# Patient Record
Sex: MALE | Race: WHITE | NOT HISPANIC OR LATINO | Employment: FULL TIME | URBAN - METROPOLITAN AREA
[De-identification: names, ages, dates, MRNs, and addresses within clinical notes are randomized per-mention and may not be internally consistent; named-entity substitution may affect disease eponyms.]

---

## 2017-05-04 ENCOUNTER — HOSPITAL ENCOUNTER (OUTPATIENT)
Dept: RADIOLOGY | Facility: HOSPITAL | Age: 67
Discharge: HOME | End: 2017-05-04
Attending: NEUROLOGICAL SURGERY
Payer: COMMERCIAL

## 2017-05-04 ENCOUNTER — OFFICE VISIT (OUTPATIENT)
Dept: NEUROSURGERY | Facility: CLINIC | Age: 67
End: 2017-05-04
Payer: COMMERCIAL

## 2017-05-04 VITALS
HEART RATE: 73 BPM | WEIGHT: 191.5 LBS | BODY MASS INDEX: 26.81 KG/M2 | HEIGHT: 71 IN | DIASTOLIC BLOOD PRESSURE: 75 MMHG | TEMPERATURE: 98 F | SYSTOLIC BLOOD PRESSURE: 113 MMHG

## 2017-05-04 DIAGNOSIS — Z98.1 HISTORY OF LUMBAR FUSION: ICD-10-CM

## 2017-05-04 DIAGNOSIS — M54.17 LUMBOSACRAL RADICULOPATHY: Primary | ICD-10-CM

## 2017-05-04 DIAGNOSIS — M54.17 LUMBOSACRAL RADICULOPATHY: ICD-10-CM

## 2017-05-04 PROCEDURE — 1125F AMNT PAIN NOTED PAIN PRSNT: CPT | Mod: S$GLB,,, | Performed by: NEUROLOGICAL SURGERY

## 2017-05-04 PROCEDURE — 72114 X-RAY EXAM L-S SPINE BENDING: CPT | Mod: 26,,, | Performed by: RADIOLOGY

## 2017-05-04 PROCEDURE — 72114 X-RAY EXAM L-S SPINE BENDING: CPT | Mod: TC

## 2017-05-04 PROCEDURE — 99204 OFFICE O/P NEW MOD 45 MIN: CPT | Mod: S$GLB,,, | Performed by: NEUROLOGICAL SURGERY

## 2017-05-04 PROCEDURE — 1159F MED LIST DOCD IN RCRD: CPT | Mod: S$GLB,,, | Performed by: NEUROLOGICAL SURGERY

## 2017-05-04 PROCEDURE — 1157F ADVNC CARE PLAN IN RCRD: CPT | Mod: 8P,S$GLB,, | Performed by: NEUROLOGICAL SURGERY

## 2017-05-04 PROCEDURE — 1160F RVW MEDS BY RX/DR IN RCRD: CPT | Mod: S$GLB,,, | Performed by: NEUROLOGICAL SURGERY

## 2017-05-04 PROCEDURE — 99999 PR PBB SHADOW E&M-NEW PATIENT-LVL III: CPT | Mod: 25,PBBFAC,, | Performed by: NEUROLOGICAL SURGERY

## 2017-05-04 RX ORDER — IBUPROFEN AND FAMOTIDINE 800; 26.6 MG/1; MG/1
1 TABLET, COATED ORAL 3 TIMES DAILY
Refills: 2 | COMMUNITY
Start: 2017-04-25 | End: 2017-09-27

## 2017-05-04 RX ORDER — SILODOSIN 4 MG/1
4 CAPSULE ORAL DAILY
COMMUNITY
End: 2023-09-14 | Stop reason: ALTCHOICE

## 2017-05-04 RX ORDER — OXYCODONE HYDROCHLORIDE 7.5 MG/1
TABLET ORAL
Refills: 0 | COMMUNITY
Start: 2017-03-27 | End: 2023-09-14 | Stop reason: SDUPTHER

## 2017-05-04 RX ORDER — DILTIAZEM HYDROCHLORIDE 180 MG/1
180 CAPSULE, EXTENDED RELEASE ORAL DAILY
COMMUNITY
End: 2023-09-14 | Stop reason: SDUPTHER

## 2017-05-04 RX ORDER — LISINOPRIL 20 MG/1
20 TABLET ORAL DAILY
COMMUNITY
End: 2023-09-14 | Stop reason: DRUGHIGH

## 2017-05-04 NOTE — PROGRESS NOTES
CHIEF COMPLAINT:  Second Opinion    By signing my name below, I, Deangelo Suazo, attest that this documentation has been prepared under the direction and in the presence of Vijay Munoz MD.    HPI:  Sundar Montanez is a 67 y.o.  male, who presents today for a second opinion. Pt's previous surgery was performed 12/2011 after injuring his back doing work around the house. Pt notes a year of relief following the surgery and then symptoms returned. Symptoms have continually worsened since then. Pt reports constant low back pain radiating to the posterior RLE. Walking, sitting, and standing exacerbate the pain. Pt has taken Ibuprofen 800 mg with some relief. Pt rates the pain as a 6/10. Pt denies pain in his LLE. Pt denies b/b dysfunction. Pt received a coronary stent last summer and is now on Aspirin and Plavix. Pt denies neck and arm pain. Pt also denies dropping objects. Pt notes imbalance secondary to RLE pain. Pt has received injections in the past.     Review of patient's allergies indicates:  No Known Allergies    No past medical history on file.  No past surgical history on file.  No family history on file.  Social History   Substance Use Topics    Smoking status: Former Smoker     Quit date: 5/4/1972    Smokeless tobacco: Never Used    Alcohol use No        Review of Systems   Constitutional: Negative.    HENT: Negative.    Eyes: Negative.    Respiratory: Negative.    Cardiovascular: Negative.    Gastrointestinal: Negative.    Endocrine: Negative.    Genitourinary: Negative.    Musculoskeletal: Positive for back pain (low back pain radiating to RLE) and myalgias (posterior RLE). Negative for gait problem and neck pain.   Skin: Negative.    Allergic/Immunologic: Negative.    Neurological: Negative for weakness, light-headedness, numbness and headaches.        Imbalance secondary to pain     Hematological: Negative.    Psychiatric/Behavioral: Negative.        OBJECTIVE:   Vital Signs:  Temp: 97.9 °F (36.6  °C) (05/04/17 0826)  Pulse: 73 (05/04/17 0826)  BP: 113/75 (05/04/17 0826)    Physical Exam:    Vital signs: All nursing notes and vital signs reviewed -- afebrile, vital signs stable.  Constitutional: Patient sitting comfortably in chair. Appears well developed and well nourished.  Skin: Exposed areas are intact without abnormal markings, rashes or other lesions. Four well healed incision in the lumbar region and left flank.   HEENT: Normocephalic. Normal conjunctivae.  Cardiovascular: Normal rate and regular rhythm.  Respiratory: Chest wall rises and falls symmetrically, without signs of respiratory distress.  Abdomen: Soft and non-tender.  Extremities: Warm and without edema. Calves supple, non-tender.  Psych/Behavior: Normal affect.    Neurological:    Mental status: Alert and oriented. Conversational and appropriate.       Cranial Nerves: Grossly intact.     Motor:    Upper:  Deltoids Triceps Biceps WE WF     R 5/5 5/5 5/5 5/5 5/5 5/5    L 5/5 5/5 5/5 5/5 5/5 5/5      Lower:  HF KE KF DF PF EHL    R 5/5 5/5 5/5 5/5 5/5 5/5    L 5/5 5/5 5/5 5/5 5/5 5/5     Sensory: Intact sensation to light touch in all extremities. Romberg negative.    Reflexes:          DTR: 2+ symmetrically throughout.     Flores's: Negative.     Babinski's: Negative.     Clonus: Negative.    Cerebellar: Finger-to-nose and rapid alternating movements normal. Gait stable, fluid.    Spine:    Posture: Head well aligned over pelvis in front and side views.  No focal or global spinal deformity visible on inspection. Shoulders and hips even. No obvious leg length discrepancy. No scapula winging.    Bending: Full ROM with forward, back and lateral bending. No rib prominence with forward bend.    Cervical:      ROM: Full with flexion, extension, lateral rotation and ear-to-shoulder bend.      Midline TTP: Negative.     Spurling's test: Negative.     Lhermitte's: Negative.    Thoracic:     Midline TTP: Negative    Lumbar:     Midline TTP:  Negative     Straight Leg Test: Negative     Crossed Straight Leg Test: Negative     Sciatic notch tenderness: Negative.    Other:     SI joint TTP: Negative.     Greater trochanter TTP: Negative.     Tenderness with external/internal hip rotation: Negative.    Diagnostic Results:  All imaging was independently reviewed by me.    MRI L-spine, dated 2/24/2017:  1. Pedicle screw fixation and XLIF L4/5  2. Moderate central and right neuroforaminal stenosis at L3/4   3. Mild to moderate right neuroforaminal stenosis at L5/S1  4. Diffuse lumbar spondylosis  5. Mild right coronal lumbar curve apex at L2      ASSESSMENT/PLAN:     Sundar Montanez has right lumbosacral radiculopathy in the setting of mild to moderate right L5/S1 neuroforaminal stenosis. I recommend a CT L-spine to assess for bony fusion at L4/5 and a Flex/Ex L-spine to rule out instability. I will reevaluate once complete. I anticipate starting with selective nerve block at right L5/S1.     The patient understands and agrees with the plan of care. All questions were answered.     1. CT L-spine   2. Flex/Ex L-spine  3. RTC pending #1-2    I, Dr. Vijay West personally performed the services described in this documentation. All medical record entries made by the scribe, Deangelo Suazo, were at my direction and in my presence.  I have reviewed the chart and agree that the record reflects my personal performance and is accurate and complete.      Vijay West M.D.  Department of Neurosurgery  [facility]      .

## 2017-05-04 NOTE — LETTER
May 4, 2017      Kenny Leavitt Sr., MD  [address]  [place] [address]           [facility] - Neurosurgery 7th Fl  [address]  [facility] [address]  Phone: [phone]          Patient: Sundar Montanez   MR Number: [id number]   YOB: 1950   Date of Visit: 5/4/2017       Dear Dr. Kenny Leavitt Sr.:    Thank you for referring Sundar Montanez to me for evaluation. Attached you will find relevant portions of my assessment and plan of care.    If you have questions, please do not hesitate to call me. I look forward to following Sundar Montanez along with you.    Sincerely,    Vijay West MD    Enclosure  CC:  No Recipients    If you would like to receive this communication electronically, please contact [email or web address] or [phone] to request more information on [employer] access.    For providers and/or their staff who would like to refer a patient to [facility], please contact us through our one-stop-shop provider referral line, [facility], at [phone].    If you feel you have received this communication in error or would no longer like to receive these types of communications, please e-mail [email or web address]

## 2017-06-06 ENCOUNTER — OFFICE VISIT (OUTPATIENT)
Dept: NEUROSURGERY | Facility: CLINIC | Age: 67
End: 2017-06-06
Payer: COMMERCIAL

## 2017-06-06 ENCOUNTER — HOSPITAL ENCOUNTER (OUTPATIENT)
Dept: RADIOLOGY | Facility: HOSPITAL | Age: 67
Discharge: HOME | End: 2017-06-06
Attending: NEUROLOGICAL SURGERY
Payer: COMMERCIAL

## 2017-06-06 VITALS
HEIGHT: 71 IN | HEART RATE: 67 BPM | BODY MASS INDEX: 27.77 KG/M2 | DIASTOLIC BLOOD PRESSURE: 89 MMHG | TEMPERATURE: 98 F | WEIGHT: 198.38 LBS | SYSTOLIC BLOOD PRESSURE: 148 MMHG

## 2017-06-06 DIAGNOSIS — M54.17 LUMBOSACRAL RADICULOPATHY: ICD-10-CM

## 2017-06-06 DIAGNOSIS — M48.061 SPINAL STENOSIS, LUMBAR: ICD-10-CM

## 2017-06-06 DIAGNOSIS — Z98.1 HISTORY OF LUMBAR FUSION: ICD-10-CM

## 2017-06-06 DIAGNOSIS — M51.36 DDD (DEGENERATIVE DISC DISEASE), LUMBAR: Primary | ICD-10-CM

## 2017-06-06 PROCEDURE — 99214 OFFICE O/P EST MOD 30 MIN: CPT | Mod: S$GLB,,, | Performed by: NEUROLOGICAL SURGERY

## 2017-06-06 PROCEDURE — 72131 CT LUMBAR SPINE W/O DYE: CPT | Mod: TC

## 2017-06-06 PROCEDURE — 1159F MED LIST DOCD IN RCRD: CPT | Mod: S$GLB,,, | Performed by: NEUROLOGICAL SURGERY

## 2017-06-06 PROCEDURE — 72131 CT LUMBAR SPINE W/O DYE: CPT | Mod: 26,,, | Performed by: RADIOLOGY

## 2017-06-06 PROCEDURE — 99999 PR PBB SHADOW E&M-EST. PATIENT-LVL III: CPT | Mod: PBBFAC,,, | Performed by: NEUROLOGICAL SURGERY

## 2017-06-06 PROCEDURE — 1125F AMNT PAIN NOTED PAIN PRSNT: CPT | Mod: S$GLB,,, | Performed by: NEUROLOGICAL SURGERY

## 2017-06-06 RX ORDER — SILODOSIN 8 MG/1
CAPSULE ORAL
COMMUNITY
Start: 2017-05-18 | End: 2023-09-14 | Stop reason: ALTCHOICE

## 2017-06-06 RX ORDER — HYDROCODONE BITARTRATE AND ACETAMINOPHEN 10; 325 MG/1; MG/1
TABLET ORAL
COMMUNITY
Start: 2017-04-19 | End: 2023-09-14 | Stop reason: ALTCHOICE

## 2017-06-06 RX ORDER — CLOPIDOGREL BISULFATE 75 MG/1
TABLET ORAL
COMMUNITY
Start: 2017-05-18 | End: 2023-09-14

## 2017-06-06 RX ORDER — LISINOPRIL 40 MG/1
TABLET ORAL
COMMUNITY
Start: 2017-05-08 | End: 2023-09-14 | Stop reason: DRUGHIGH

## 2017-06-06 RX ORDER — ESZOPICLONE 3 MG/1
TABLET, FILM COATED ORAL
COMMUNITY
Start: 2017-05-08 | End: 2023-06-12 | Stop reason: SDUPTHER

## 2017-06-06 RX ORDER — DILTIAZEM HYDROCHLORIDE 360 MG/1
CAPSULE, EXTENDED RELEASE ORAL
COMMUNITY
Start: 2017-05-08

## 2017-06-06 RX ORDER — ATORVASTATIN CALCIUM 20 MG/1
TABLET, FILM COATED ORAL
COMMUNITY
Start: 2017-05-18 | End: 2023-09-14 | Stop reason: SDUPTHER

## 2017-06-06 NOTE — PROGRESS NOTES
CHIEF COMPLAINT:  Follow Up     I, Deangelo Suazo, attest that this documentation has been prepared under the direction and in the presence of Vijay Munoz MD.    HPI:  Sundar Montanez is a 67 y.o.  male, who presents today for follow up evaluation of low back pain. Pt reports his condition is stable since his last visit. Pt has previously received injections in his low back. He has also participated in physical therapy; however, he believes it did not help and made his pain worse.     Review of patient's allergies indicates:  No Known Allergies    Past Medical History:   Diagnosis Date    Hypertension      Past Surgical History:   Procedure Laterality Date    SPINAL FUSION       Family History   Problem Relation Age of Onset    Heart attack Mother     Alcohol abuse Father     Breast cancer Sister     Breast cancer Sister      Social History   Substance Use Topics    Smoking status: Former Smoker     Quit date: 5/4/1972    Smokeless tobacco: Never Used    Alcohol use No        Review of Systems   Constitutional: Negative.    HENT: Negative.    Eyes: Negative.    Respiratory: Negative.    Cardiovascular: Negative.    Gastrointestinal: Negative.    Endocrine: Negative.    Genitourinary: Negative.    Musculoskeletal: Positive for back pain (Low back pain raditating to RLE). Negative for gait problem and neck pain.   Skin: Negative.    Allergic/Immunologic: Negative.    Neurological: Negative for weakness, light-headedness, numbness and headaches.   Hematological: Negative.    Psychiatric/Behavioral: Negative.        OBJECTIVE:   Vital Signs:  Temp: 97.8 °F (36.6 °C) (06/06/17 0920)  Pulse: 67 (06/06/17 0920)  BP: (!) 148/89 (06/06/17 0920)    Physical Exam:    Vital signs: All nursing notes and vital signs reviewed -- afebrile, vital signs stable.  Constitutional: Patient sitting comfortably in chair. Appears well developed and well nourished.  Skin: Exposed areas are intact without abnormal markings,  rashes or other lesions. Four well healed incision in the lumbar region and left flank.   HEENT: Normocephalic. Normal conjunctivae.  Cardiovascular: Normal rate and regular rhythm.  Respiratory: Chest wall rises and falls symmetrically, without signs of respiratory distress.  Abdomen: Soft and non-tender.  Extremities: Warm and without edema. Calves supple, non-tender.  Psych/Behavior: Normal affect.    Neurological:    Mental status: Alert and oriented. Conversational and appropriate.       Cranial Nerves: Grossly intact.     Motor:    Upper:  Deltoids Triceps Biceps WE WF     R 5/5 5/5 5/5 5/5 5/5 5/5    L 5/5 5/5 5/5 5/5 5/5 5/5      Lower:  HF KE KF DF PF EHL    R 5/5 5/5 5/5 5/5 5/5 5/5    L 5/5 5/5 5/5 5/5 5/5 5/5     Sensory: Intact sensation to light touch in all extremities. Romberg negative.    Reflexes:          DTR: 2+ symmetrically throughout.     Flores's: Negative.     Babinski's: Negative.     Clonus: Negative.    Cerebellar: Finger-to-nose and rapid alternating movements normal. Gait stable, fluid.    Spine:    Posture: Head well aligned over pelvis in front and side views.  No focal or global spinal deformity visible on inspection. Shoulders and hips even. No obvious leg length discrepancy. No scapula winging.    Bending: Full ROM with forward, back and lateral bending. No rib prominence with forward bend.    Cervical:      ROM: Full with flexion, extension, lateral rotation and ear-to-shoulder bend.      Midline TTP: Negative.     Spurling's test: Negative.     Lhermitte's: Negative.    Thoracic:     Midline TTP: Negative    Lumbar:     Midline TTP: Negative     Straight Leg Test: Negative     Crossed Straight Leg Test: Negative     Sciatic notch tenderness: Negative.    Other:     SI joint TTP: Negative.     Greater trochanter TTP: Negative.     Tenderness with external/internal hip rotation: Negative.    Diagnostic Results:  All imaging was independently reviewed by me.    Flex/Ex X-ray  L-spine, dated 5/4/2017:  1. No instability    CT L-spine, dated 6/6/2017:  1. Bony fusion at L4/5  2. Vacuum disc at L3/4 and L5/S1  3. 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 I will refer him to pain management for a selective nerve root block at right L5-S1 and facet joint injections at L3-4 and L5/S1. He will follow up with me in 3 months.     The patient understands and agrees with the plan of care. All questions were answered.     1. Referral to pain management  2. RTC 3 months    I, Dr. Vijay West personally performed the services described in this documentation. All medical record entries made by the scribe, Deangelo Suazo, were at my direction and in my presence.  I have reviewed the chart and agree that the record reflects my personal performance and is accurate and complete.      Vijay West M.D.  Department of Neurosurgery  [facility]

## 2017-07-18 ENCOUNTER — OFFICE VISIT (OUTPATIENT)
Dept: PAIN MEDICINE | Facility: CLINIC | Age: 67
End: 2017-07-18
Attending: ANESTHESIOLOGY
Payer: COMMERCIAL

## 2017-07-18 VITALS
TEMPERATURE: 98 F | BODY MASS INDEX: 27.72 KG/M2 | HEART RATE: 64 BPM | DIASTOLIC BLOOD PRESSURE: 78 MMHG | WEIGHT: 198 LBS | SYSTOLIC BLOOD PRESSURE: 118 MMHG | HEIGHT: 71 IN

## 2017-07-18 DIAGNOSIS — M47.816 LUMBAR SPONDYLOSIS: Primary | ICD-10-CM

## 2017-07-18 DIAGNOSIS — M51.36 DDD (DEGENERATIVE DISC DISEASE), LUMBAR: ICD-10-CM

## 2017-07-18 DIAGNOSIS — M47.816 FACET ARTHRITIS OF LUMBAR REGION: ICD-10-CM

## 2017-07-18 PROBLEM — M51.369 DDD (DEGENERATIVE DISC DISEASE), LUMBAR: Status: ACTIVE | Noted: 2017-07-18

## 2017-07-18 PROCEDURE — 99999 PR PBB SHADOW E&M-EST. PATIENT-LVL III: CPT | Mod: PBBFAC,,, | Performed by: ANESTHESIOLOGY

## 2017-07-18 PROCEDURE — 99245 OFF/OP CONSLTJ NEW/EST HI 55: CPT | Mod: S$GLB,,, | Performed by: ANESTHESIOLOGY

## 2017-07-18 NOTE — PROGRESS NOTES
Subjective:      Patient ID: Sundar Montanez is a 67 y.o. male.    Chief Complaint: Low-back Pain    Referred by: Vijay West MD     HPI: Pain has been going on for 5 years after his lumbar fusion. He states the fusion with pedicle screws helped for 1 year. Now has pain in his R leg and lower back- worse in lower back. Pain is sharp and throbbing and tingling. Worsened by sitting and standing and bending, walking and lifting and sitting up from sitting position. He saw Dr. West who referred him to us for focus on lumbar spondylosis and possible L5-S1 radiculopathy. He has tried PT, NSAIDs, nerve pain meds which all do not help. Opiates do help.     Past Medical History:   Diagnosis Date    Hypertension        Past Surgical History:   Procedure Laterality Date    SPINAL FUSION         Review of patient's allergies indicates:  No Known Allergies    Current Outpatient Prescriptions   Medication Sig Dispense Refill    atorvastatin (LIPITOR) 20 MG tablet       clopidogrel (PLAVIX) 75 mg tablet       diltiaZEM (TIAZAC) 360 MG CpSR       DUEXIS 800-26.6 mg Tab Take 1 tablet by mouth 3 (three) times daily.  2    eszopiclone 3 mg Tab       lisinopril (PRINIVIL,ZESTRIL) 40 MG tablet       silodosin (RAPAFLO) 4 mg Cap capsule Take 4 mg by mouth once daily.      diltiaZEM (TIAZAC) 180 MG CpSR Take 180 mg by mouth once daily.      hydrocodone-acetaminophen 10-325mg (NORCO)  mg Tab       lisinopril (PRINIVIL,ZESTRIL) 20 MG tablet Take 20 mg by mouth once daily.      OXAYDO 7.5 mg TbOr TK 1 T PO  QID PRN P  0    RAPAFLO 8 mg Cap capsule        No current facility-administered medications for this visit.        Family History   Problem Relation Age of Onset    Heart attack Mother     Alcohol abuse Father     Breast cancer Sister     Breast cancer Sister        Social History     Social History    Marital status:      Spouse name: N/A    Number of children: N/A    Years of education: N/A  "    Occupational History    Not on file.     Social History Main Topics    Smoking status: Former Smoker     Quit date: 5/4/1972    Smokeless tobacco: Never Used    Alcohol use No    Drug use: No    Sexual activity: Not on file     Other Topics Concern    Not on file     Social History Narrative    No narrative on file       Pain Scales  Best: 3/10  Worst: 9/10  Usually: 4/10  Today: 5/10    Low-back Pain   Pertinent negatives include no chest pain, fever, headaches or weight loss.       Interventional Pain History  3x injection in [place] ([facility])- unsure of type- did not help    Review of Systems   Constitution: Negative for chills, fever, malaise/fatigue, weight gain and weight loss.   HENT: Negative for ear pain, headaches and hoarse voice.    Eyes: Negative for blurred vision, pain and visual disturbance.   Cardiovascular: Negative for chest pain, dyspnea on exertion and irregular heartbeat.        Hypertension   Respiratory: Negative for cough, shortness of breath and wheezing.    Endocrine: Negative for cold intolerance and heat intolerance.   Hematologic/Lymphatic: Negative for adenopathy and bleeding problem. Does not bruise/bleed easily.   Skin: Negative for color change, itching and rash.   Musculoskeletal: Positive for back pain. Negative for neck pain.   Gastrointestinal: Negative for change in bowel habit, diarrhea, hematemesis, hematochezia, melena and vomiting.   Genitourinary: Negative for flank pain, frequency, hematuria and urgency.   Neurological: Negative for difficulty with concentration, dizziness, loss of balance and seizures.   Psychiatric/Behavioral: Negative for altered mental status, depression and suicidal ideas. The patient is not nervous/anxious.    Allergic/Immunologic: Negative for HIV exposure.   All other systems reviewed and are negative.          Objective:      /78   Pulse 64   Temp 98 °F (36.7 °C) (Oral)   Ht 5' 11" (1.803 m)   Wt 89.8 kg (198 lb)   " BMI 27.62 kg/m²   PHYSICAL EXAMINATION:  GEN:  Well developed, well nourished.  No acute distress. Normal pain behavior.  HEENT:  No trauma.  Mucous membranes moist.  Nares patent bilaterally.  PSYCH: Normal affect. Thought content appropriate.  CHEST:  Breathing symmetric.  No audible wheezing.  ABD: Soft, non-tender, non-distended.  SKIN:  Warm, pink, dry.  No rash on exposed areas.    EXT:  No cyanosis, clubbing, or edema.  No color change or changes in nail or hair growth.  NEURO/MUSCULOSKELETAL:  Fully alert, oriented, and appropriate. Speech normal rachel. No cranial nerve deficits.   Gait: Normal.  No trendelenburg sign bilaterally.   Strength: 5/5 motor strength throughout bilateral upper and lower extremities myotomes.   Sensory: No sensory deficit in the lower extremities dermatomes.   Reflexes:  2+ and symmetric throughout.  Downgoing Babinski's bilaterally, negative euceda's.  No clonus or spasticity.    L-Spine:  Normal ROM with pain on ext. +facet loading bilaterally.  - SLR bilaterally.  - femoral stretch bilaterally. - Milgram's test. - Well-SLR. - Slump test.  TTP over lumbar paraspinals.    SI Joint/Hip: +ANAMARIA bilaterally. +Gaenslen's bilaterally.  - FADIR bilaterally.  TTP over bilateral SI joints. No TTP over hips, piriformis muscles, or GTB.      Ortho/SPM Exam      Assessment:       Encounter Diagnoses   Name Primary?    Lumbar spondylosis Yes    Facet arthritis of lumbar region     DDD (degenerative disc disease), lumbar          Plan:       Sundar was seen today for low-back pain.    Diagnoses and all orders for this visit:    Lumbar spondylosis    Facet arthritis of lumbar region    DDD (degenerative disc disease), lumbar      -Release of record for previous interventional procedures with Dr Rob in [facility] L3/L4 and L5/S1 FJIs scheduled, consent signed after discussing risks, benefits and alternatives  -If only short term benefit from FJIs- will schedule RFA to corresponding  Medial branches and dorsal ramus  -Will consider epidural at right L5 and S1 at follow up  -Discussed surgery vs SCS as future means of tx if no benefit from interventional procedures  -Discussed guidelines for stopping Plavix before his procedure.        Mayank Henson D.O.  Resident    I have personally taken the history and examined this patient and agree with the resident's note as stated above.

## 2017-07-18 NOTE — LETTER
July 18, 2017      Vijay West MD  [address]  [facility] [address]           [facility]  [address]  [facility] [address]  Phone: [phone]  Fax: [phone]          Patient: Sundar Montanez   MR Number: [id number]   YOB: 1950   Date of Visit: 7/18/2017       Dear Dr. Vijay West:    Thank you for referring Sundar Montanez to me for evaluation. Attached you will find relevant portions of my assessment and plan of care.    If you have questions, please do not hesitate to call me. I look forward to following Sundar Montanez along with you.    Sincerely,    Jamshid Knight MD    Enclosure  CC:  No Recipients    If you would like to receive this communication electronically, please contact [email or web address] or [phone] to request more information on [employer] access.    For providers and/or their staff who would like to refer a patient to [facility], please contact us through our one-stop-shop provider referral line, [facility], at [phone].    If you feel you have received this communication in error or would no longer like to receive these types of communications, please e-mail [email or web address]

## 2017-08-01 ENCOUNTER — PATIENT MESSAGE (OUTPATIENT)
Dept: PAIN MEDICINE | Facility: CLINIC | Age: 67
End: 2017-08-01

## 2017-08-01 ENCOUNTER — TELEPHONE (OUTPATIENT)
Dept: PAIN MEDICINE | Facility: CLINIC | Age: 67
End: 2017-08-01

## 2017-08-01 NOTE — TELEPHONE ENCOUNTER
Contacted and spoke to patient, informed him the office has received his clearance and the schedulers will be contacting him.

## 2017-08-01 NOTE — TELEPHONE ENCOUNTER
----- Message from Griselda Kim sent at 8/1/2017  1:37 PM CDT -----  _  1st Request  _  2nd Request  _  3rd Request      patient  Who:     Why:   Pt is calling to schedule his procedure, his wife emailed his release from his Cardio doctor Dr Jose Manuel Jasso. Please check to see if you received it.  Please call pt   What Number to Call Back:[phone]    When to Expect a call back: (With in 24 hours)

## 2017-09-11 ENCOUNTER — SURGERY (OUTPATIENT)
Age: 67
End: 2017-09-11

## 2017-09-11 ENCOUNTER — HOSPITAL ENCOUNTER (OUTPATIENT)
Facility: OTHER | Age: 67
Discharge: HOME | End: 2017-09-11
Attending: ANESTHESIOLOGY | Admitting: ANESTHESIOLOGY
Payer: COMMERCIAL

## 2017-09-11 VITALS
WEIGHT: 190 LBS | DIASTOLIC BLOOD PRESSURE: 71 MMHG | TEMPERATURE: 98 F | OXYGEN SATURATION: 97 % | HEIGHT: 71 IN | BODY MASS INDEX: 26.6 KG/M2 | RESPIRATION RATE: 18 BRPM | HEART RATE: 60 BPM | SYSTOLIC BLOOD PRESSURE: 117 MMHG

## 2017-09-11 DIAGNOSIS — M47.816 LUMBAR SPONDYLOSIS: Primary | ICD-10-CM

## 2017-09-11 DIAGNOSIS — M47.816 SPONDYLOSIS OF LUMBAR SPINE: ICD-10-CM

## 2017-09-11 PROCEDURE — 25500020 PHARM REV CODE 255: Performed by: ANESTHESIOLOGY

## 2017-09-11 PROCEDURE — 63600175 PHARM REV CODE 636 W HCPCS: Performed by: ANESTHESIOLOGY

## 2017-09-11 PROCEDURE — 25000003 PHARM REV CODE 250: Performed by: ANESTHESIOLOGY

## 2017-09-11 PROCEDURE — 64493 INJ PARAVERT F JNT L/S 1 LEV: CPT | Mod: 50,,, | Performed by: ANESTHESIOLOGY

## 2017-09-11 PROCEDURE — 64494 INJ PARAVERT F JNT L/S 2 LEV: CPT | Mod: 50,,, | Performed by: ANESTHESIOLOGY

## 2017-09-11 PROCEDURE — 64494 INJ PARAVERT F JNT L/S 2 LEV: CPT | Mod: 50 | Performed by: ANESTHESIOLOGY

## 2017-09-11 PROCEDURE — 64493 INJ PARAVERT F JNT L/S 1 LEV: CPT | Mod: 50 | Performed by: ANESTHESIOLOGY

## 2017-09-11 RX ORDER — LIDOCAINE HYDROCHLORIDE 10 MG/ML
INJECTION INFILTRATION; PERINEURAL
Status: DISCONTINUED | OUTPATIENT
Start: 2017-09-11 | End: 2017-09-11 | Stop reason: HOSPADM

## 2017-09-11 RX ORDER — TRIAMCINOLONE ACETONIDE 40 MG/ML
INJECTION, SUSPENSION INTRA-ARTICULAR; INTRAMUSCULAR
Status: DISCONTINUED | OUTPATIENT
Start: 2017-09-11 | End: 2017-09-11 | Stop reason: HOSPADM

## 2017-09-11 RX ORDER — BUPIVACAINE HYDROCHLORIDE 2.5 MG/ML
INJECTION, SOLUTION EPIDURAL; INFILTRATION; INTRACAUDAL
Status: DISCONTINUED | OUTPATIENT
Start: 2017-09-11 | End: 2017-09-11 | Stop reason: HOSPADM

## 2017-09-11 RX ORDER — ALPRAZOLAM 0.5 MG/1
0.5 TABLET, ORALLY DISINTEGRATING ORAL ONCE
Status: COMPLETED | OUTPATIENT
Start: 2017-09-11 | End: 2017-09-11

## 2017-09-11 RX ADMIN — LIDOCAINE HYDROCHLORIDE 10 ML: 10 INJECTION, SOLUTION INFILTRATION; PERINEURAL at 11:09

## 2017-09-11 RX ADMIN — IOHEXOL 3 ML: 300 INJECTION, SOLUTION INTRAVENOUS at 11:09

## 2017-09-11 RX ADMIN — TRIAMCINOLONE ACETONIDE 40 MG: 40 INJECTION, SUSPENSION INTRA-ARTICULAR; INTRAMUSCULAR at 11:09

## 2017-09-11 RX ADMIN — ALPRAZOLAM 0.5 MG: 0.5 TABLET, ORALLY DISINTEGRATING ORAL at 11:09

## 2017-09-11 RX ADMIN — BUPIVACAINE HYDROCHLORIDE 10 ML: 2.5 INJECTION, SOLUTION EPIDURAL; INFILTRATION; INTRACAUDAL; PERINEURAL at 11:09

## 2017-09-11 NOTE — DISCHARGE INSTRUCTIONS

## 2017-09-11 NOTE — OP NOTE
Thoracic/Lumbar Facet Joint Injection Under Fluoroscopy  Time-out taken to identify patient and procedure side prior to starting the procedure.            I attest that I have reviewed the patient's home medications prior to the procedure and no contraindication have been identified. I  re-evaluated the patient after the patient was positioned for the procedure in the procedure room immediately before the procedural time-out. The vital signs are current and represent the current state of the patient which has not significantly changed since the preprocedure assessment.   Date of Service: 09/11/2017    PCP: Kenny Leavitt Sr, MD    Referring Physician:                                                        PROCEDURE:  bilateral facet joint injection at L3-4 and L5-S1 under fluoroscopy.    REASON FOR PROCEDURE: Facet arthropathy, lumbar [M12.88]    PHYSICIAN: Jamshid Knight MD  ASSISTANTS: Dayo Johnson MD    MEDICATIONS INJECTED:  0.5ml Kenalog 40mg and Bupivacaine 0.25% 10ml. Injected 1.5ml  per level.  LOCAL ANESTHETIC USED:   Xylocaine 1% 9ml with Sodium Bicarbonate 1ml. 3ml per site.  SEDATION MEDICATIONS: None    ESTIMATED BLOOD LOSS:  None.    COMPLICATIONS:  None.    TECHNIQUE:   Lying in a prone position, the patient was prepped and draped in the usual sterile fashion using ChloraPrep and fenestrated drape.  The level was determined under fluoroscopic guidance.  Local anesthetic was given by going down to the hub of the 27-gauge 1.25in needle and raising a wheel.  The 3.5in 22-gauge needle was introduced into the >>facet joint.  Negative pressure applied to make sure that there was no intravascular placement.  Omnipaque was injected to confirm placement.  It was also done to confirm that there was no vascular runoff.  Medication was then injected slowly.  The patient tolerated the procedure well.     PAIN BEFORE THE PROCEDURE:  7/10.    PAIN AFTER THE PROCEDURE: 1/10.    The patient was monitored after  the procedure.  Patient was given post procedure and discharge instructions to follow at home.  We will see the patient back in two weeks or the patient may call to inform of status. The patient was discharged in a stable condition

## 2017-09-18 ENCOUNTER — TELEPHONE (OUTPATIENT)
Dept: PAIN MEDICINE | Facility: CLINIC | Age: 67
End: 2017-09-18

## 2017-09-18 NOTE — TELEPHONE ENCOUNTER
----- Message from Ector Jasso sent at 9/18/2017  3:25 PM CDT -----  Contact: Pt  X_ 1st Request  _ 2nd Request  _ 3rd Request    Who:NAOMIE LINN [[id number]]    Why: Patient would like to speak with staff in regards to feeling worse since his injections. Please advise    What Number to Call Back: [phone]    When to Expect a call back: (Before the end of the day)  -- if call after 3:00 call back will be tomorrow.

## 2017-09-20 ENCOUNTER — TELEPHONE (OUTPATIENT)
Dept: PAIN MEDICINE | Facility: CLINIC | Age: 67
End: 2017-09-20

## 2017-09-20 NOTE — TELEPHONE ENCOUNTER
Contacted and spoke to patient regarding his message, staff apologized for the delayed call.     Patient reports he had a procedure B/L Facet Injections @ L5/S1 and L3/L4 on 09/11/2017 and he is experiencing increase pain at this time.     He was informed by the staff that, it has been approximately 8 days and some pain, even increase pain is normal. Up to 14 days after the procedure. The physicians normally recommends patients use ice on the injection site in 20  Minute increments and if he has no contraindication he can take otc medication to help with some of the pain.     He does have a follow up to come in 2 weeks time after procedure, but he was informed to contact us if for any reason he has additional concerns.     Patient verbalized understanding.

## 2017-09-20 NOTE — TELEPHONE ENCOUNTER
----- Message from Melissa Melchor MA sent at 9/18/2017  5:04 PM CDT -----  Kalee Breen Staff  Caller: Patient (Today,  4:35 PM)          _1st Request   X_  2nd Request   _  3rd Request     Who:NAOMIE LINN [[id number]]       Why:Patient states he was returning a call back from the staff     What Number to Call Back:[phone]     When to Expect a call back: (Before the end of the day)   -- if call after 3:00 call back will be tomorrow.

## 2017-09-27 ENCOUNTER — OFFICE VISIT (OUTPATIENT)
Dept: PAIN MEDICINE | Facility: CLINIC | Age: 67
End: 2017-09-27
Payer: COMMERCIAL

## 2017-09-27 VITALS
TEMPERATURE: 97 F | DIASTOLIC BLOOD PRESSURE: 75 MMHG | RESPIRATION RATE: 16 BRPM | HEIGHT: 71 IN | BODY MASS INDEX: 26.85 KG/M2 | SYSTOLIC BLOOD PRESSURE: 140 MMHG | HEART RATE: 61 BPM | WEIGHT: 191.81 LBS

## 2017-09-27 DIAGNOSIS — M54.17 LUMBOSACRAL RADICULOPATHY: Primary | ICD-10-CM

## 2017-09-27 DIAGNOSIS — M47.816 FACET ARTHRITIS OF LUMBAR REGION: ICD-10-CM

## 2017-09-27 DIAGNOSIS — M17.11 PRIMARY OSTEOARTHRITIS OF RIGHT KNEE: ICD-10-CM

## 2017-09-27 DIAGNOSIS — M47.816 LUMBAR SPONDYLOSIS: ICD-10-CM

## 2017-09-27 DIAGNOSIS — M51.36 DDD (DEGENERATIVE DISC DISEASE), LUMBAR: ICD-10-CM

## 2017-09-27 PROCEDURE — 3008F BODY MASS INDEX DOCD: CPT | Mod: S$GLB,,, | Performed by: NURSE PRACTITIONER

## 2017-09-27 PROCEDURE — 99214 OFFICE O/P EST MOD 30 MIN: CPT | Mod: S$GLB,,, | Performed by: NURSE PRACTITIONER

## 2017-09-27 PROCEDURE — 1125F AMNT PAIN NOTED PAIN PRSNT: CPT | Mod: S$GLB,,, | Performed by: NURSE PRACTITIONER

## 2017-09-27 PROCEDURE — 1159F MED LIST DOCD IN RCRD: CPT | Mod: S$GLB,,, | Performed by: NURSE PRACTITIONER

## 2017-09-27 PROCEDURE — 99999 PR PBB SHADOW E&M-EST. PATIENT-LVL III: CPT | Mod: PBBFAC,,, | Performed by: NURSE PRACTITIONER

## 2017-09-27 RX ORDER — TIZANIDINE 4 MG/1
4 TABLET ORAL 2 TIMES DAILY PRN
Qty: 60 TABLET | Refills: 0 | Status: SHIPPED | OUTPATIENT
Start: 2017-09-27 | End: 2017-10-27

## 2017-09-27 RX ORDER — DICLOFENAC SODIUM 20 MG/G
2 SOLUTION TOPICAL 2 TIMES DAILY
Qty: 2 BOTTLE | Refills: 2 | Status: SHIPPED | OUTPATIENT
Start: 2017-09-27 | End: 2017-12-26

## 2017-09-27 NOTE — PROGRESS NOTES
Chronic patient Established Note (Follow up visit)      SUBJECTIVE:    Sundar Montanez presents to the clinic for a follow-up appointment for lower back and right lower extremity pain.  He is s/p bilateral L3-4 and L5-S1 facet joint injections on 9/11/17 with no pain relief.  He feels as though his pain is more severe.  He continues with pain that begins to the right lower back with radiation down the posterior aspect of the leg to the knee.  He feels as though his gait is affected.  He has a history of L4-5 fusion in 2011.  He was evaluated by Dr. West earlier this year and referred here.  Since the last visit, Sundar Montanez states the pain has been worsening. Current pain intensity is 7/10.    Pain Disability Index Review:  Last 3 PDI Scores 9/27/2017 7/18/2017   Pain Disability Index (PDI) 43 19       Pain Medications:  None    Opioid Contract: no     report:  Not applicable    Pain Procedures:   9/11/17 Bilateral L3-4 and L5-S1 facet joint injections- limited benefit    Physical Therapy/Home Exercise: yes    Imaging:     Lumbar CT 6/6/17    Narrative     Procedure Comment: 2.5-mm axial images of the lumbar spine were obtained without the administration of intravenous contrast material.  Coronal and sagittal reformatted images were reviewed.    Results:    There are postoperative changes of posterior spinal fusion with bi-pedicular screws and interbody disc spacer at the level of L4-L5. The hardware appears intact without evidence of abnormal surrounding lucency is or fractures. No evidence to suggest loosening. The lumbar vertebral bodies demonstrate adequate alignment. The vertebral body heights are maintained. There is mild narrowing of the disc spaces at multiple levels. Dextroscoliosis of the lumbar spine is noted. No evidence of acute fractures or dislocations.    T12-L1: No significant spinal canal stenosis or neuroforaminal narrowing.  L1-2: Mild disc bulge. No significant spinal canal  stenosis or neural foramina narrowing.  L2-3: Mild disc bulge. No significant spinal canal stenosis or neuroforaminal narrowing.  L3-4: Mild disc bulge. Resulting in mild spinal canal stenosis. No significant neuroforaminal narrowing.  L4-5: Mild disc bulge. No significant spinal canal stenosis or neural foramina narrowing.  L5-S1: No significant spinal canal stenosis or neural foramina narrowing.    Moderate atherosclerotic calcifications of the aorta and common iliacs are noted. The remaining visualized paravertebral structures demonstrate no pathology.   Impression          Stable postoperative changes of posterior spinal fusion with interbody disc spacer at the level of L4-L5. The hardware is intact without evidence of fractures or loosening.    Mild to moderate degenerative changes of the lumbar spine, as detailed above         Allergies: Review of patient's allergies indicates:  No Known Allergies    Current Medications:   Current Outpatient Prescriptions   Medication Sig Dispense Refill    atorvastatin (LIPITOR) 20 MG tablet       clopidogrel (PLAVIX) 75 mg tablet       diltiaZEM (TIAZAC) 180 MG CpSR Take 180 mg by mouth once daily.      diltiaZEM (TIAZAC) 360 MG CpSR       DUEXIS 800-26.6 mg Tab Take 1 tablet by mouth 3 (three) times daily.  2    eszopiclone 3 mg Tab       hydrocodone-acetaminophen 10-325mg (NORCO)  mg Tab       lisinopril (PRINIVIL,ZESTRIL) 20 MG tablet Take 20 mg by mouth once daily.      lisinopril (PRINIVIL,ZESTRIL) 40 MG tablet       OXAYDO 7.5 mg TbOr TK 1 T PO  QID PRN P  0    RAPAFLO 8 mg Cap capsule       silodosin (RAPAFLO) 4 mg Cap capsule Take 4 mg by mouth once daily.       No current facility-administered medications for this visit.        REVIEW OF SYSTEMS:    GENERAL:  No weight loss, malaise or fevers.  HEENT:  Negative for frequent or significant headaches.  NECK:  Negative for lumps, goiter, pain and significant neck swelling.  RESPIRATORY:  Negative for  "cough, wheezing or shortness of breath.  CARDIOVASCULAR:  Negative for chest pain, leg swelling or palpitations. Hypertension. Previous stent placement.    GI:  Negative for abdominal discomfort, blood in stools or black stools or change in bowel habits.  MUSCULOSKELETAL:  See HPI.  SKIN:  Negative for lesions, rash, and itching.  PSYCH:  Negative for sleep disturbance, mood disorder and recent psychosocial stressors.  HEMATOLOGY/LYMPHOLOGY:  Takes Plavix.  NEURO:   No history of headaches, syncope, paralysis, seizures or tremors.  All other reviewed and negative other than HPI.    Past Medical History:  Past Medical History:   Diagnosis Date    Hypertension        Past Surgical History:  Past Surgical History:   Procedure Laterality Date    SPINAL FUSION         Family History:  Family History   Problem Relation Age of Onset    Heart attack Mother     Alcohol abuse Father     Breast cancer Sister     Breast cancer Sister        Social History:  Social History     Social History    Marital status:      Spouse name: N/A    Number of children: N/A    Years of education: N/A     Social History Main Topics    Smoking status: Former Smoker     Quit date: 5/4/1972    Smokeless tobacco: Never Used    Alcohol use No    Drug use: No    Sexual activity: Not Asked     Other Topics Concern    None     Social History Narrative    None       OBJECTIVE:    BP (!) 140/75   Pulse 61   Temp 97 °F (36.1 °C) (Oral)   Resp 16   Ht 5' 11" (1.803 m)   Wt 87 kg (191 lb 12.8 oz)   BMI 26.75 kg/m²     PHYSICAL EXAMINATION:    General appearance: Well appearing, in no acute distress, alert and oriented x3.  Psych:  Mood and affect appropriate.  Skin: Skin color, texture, turgor normal, no rashes or lesions, in both upper and lower body.  Head/face:  Atraumatic, normocephalic. No palpable lymph nodes  Cor: RRR  Pulm: CTA  GI: Abdomen soft and non-tender.  Back: Straight leg raising in the sitting and supine " positions is negative to radicular pain. No pain to palpation over the spine or costovertebral angles.  Limited ROM with pain on flexion and extension.  Positive facet loading bilaterally.  Painful palpation to bilateral SI joints.  ANAMARIA is positive on the right.  Extremities: Peripheral joint ROM is full and pain free without obvious instability or laxity in all four extremities. No deformities, edema, or skin discoloration. Good capillary refill.  Musculoskeletal: 5/5 strength in right ankle with plantar and dorsiflexion. 5/5 strength in left ankle with plantar and dorsiflexion. 5/5 strength with right knee flexion and extension. 5/5 strength with left knee flexion and extension. 4/5 strength in right EHL, 4/5 strength in left EHL. No atrophy or tone abnormalities are noted.  Neuro: Bilateral upper and lower extremity coordination and muscle stretch reflexes are physiologic and symmetric.  Plantar response are downgoing.  No loss of sensation is noted.  Gait: Antalgic.    ASSESSMENT: 67 y.o. year old male with lower back and right leg pain, consistent with the following diagnoses:     1. Lumbosacral radiculopathy  diclofenac sodium (PENNSAID) 20 mg/gram /actuation(2 %) sopm    tizanidine (ZANAFLEX) 4 MG tablet   2. Lumbar spondylosis  diclofenac sodium (PENNSAID) 20 mg/gram /actuation(2 %) sopm    tizanidine (ZANAFLEX) 4 MG tablet   3. Facet arthritis of lumbar region  diclofenac sodium (PENNSAID) 20 mg/gram /actuation(2 %) sopm    tizanidine (ZANAFLEX) 4 MG tablet   4. DDD (degenerative disc disease), lumbar  diclofenac sodium (PENNSAID) 20 mg/gram /actuation(2 %) sopm    tizanidine (ZANAFLEX) 4 MG tablet   5. Primary osteoarthritis of right knee  diclofenac sodium (PENNSAID) 20 mg/gram /actuation(2 %) sopm    tizanidine (ZANAFLEX) 4 MG tablet         PLAN:     - Previous imaging was reviewed and discussed with the patient today.  Neurosurgery notes reviewed today.    - Will schedule for right L5 and S1 TF  MIGUELINA.  The procedure, risks, benefits and options were discussed with patient. There are no contraindications to the procedure. The patient expressed understanding and agreed to proceed.  Consent obtained today.    - We discussed lumbar SCS trial if limited benefit from above.  I provided him with a [facility] brochure.    - The patient will continue a home exercise routine to help with pain and strengthening.      - I discouraged the use of oral NSAIDs given use of Plavix and cardiac history.  He was recently prescribed Duexis and requested a refill from me.  Will start Pennsaid 2% to apply 2 pumps as needed BID for back and knee pain.  Will also start Zanaflex 4 mg BID PRN muscle pain.  He denies any history of liver disease.    - RTC 2 weeks after procedure.    - Counseled patient regarding the importance of constant sleeping habits and physical therapy.    - Dr. Knight was consulted on the patient and agrees with this plan.      The above plan and management options were discussed at length with patient. Patient is in agreement with the above and verbalized understanding.    Amarilis Barahona  09/27/2017

## 2017-11-02 ENCOUNTER — HOSPITAL ENCOUNTER (OUTPATIENT)
Facility: OTHER | Age: 67
Discharge: HOME | End: 2017-11-02
Attending: ANESTHESIOLOGY | Admitting: ANESTHESIOLOGY
Payer: COMMERCIAL

## 2017-11-02 VITALS
BODY MASS INDEX: 26.6 KG/M2 | HEART RATE: 68 BPM | SYSTOLIC BLOOD PRESSURE: 119 MMHG | OXYGEN SATURATION: 97 % | WEIGHT: 190 LBS | DIASTOLIC BLOOD PRESSURE: 73 MMHG | HEIGHT: 71 IN | TEMPERATURE: 98 F | RESPIRATION RATE: 18 BRPM

## 2017-11-02 DIAGNOSIS — M47.26 OSTEOARTHRITIS OF SPINE WITH RADICULOPATHY, LUMBAR REGION: Primary | ICD-10-CM

## 2017-11-02 DIAGNOSIS — G89.29 CHRONIC PAIN: ICD-10-CM

## 2017-11-02 PROCEDURE — 64484 NJX AA&/STRD TFRM EPI L/S EA: CPT | Mod: RT,,, | Performed by: ANESTHESIOLOGY

## 2017-11-02 PROCEDURE — 25000003 PHARM REV CODE 250: Performed by: ANESTHESIOLOGY

## 2017-11-02 PROCEDURE — 64483 NJX AA&/STRD TFRM EPI L/S 1: CPT | Mod: RT,,, | Performed by: ANESTHESIOLOGY

## 2017-11-02 PROCEDURE — 99152 MOD SED SAME PHYS/QHP 5/>YRS: CPT | Mod: ,,, | Performed by: ANESTHESIOLOGY

## 2017-11-02 PROCEDURE — 25000003 PHARM REV CODE 250: Performed by: PHYSICAL MEDICINE & REHABILITATION

## 2017-11-02 PROCEDURE — 64483 NJX AA&/STRD TFRM EPI L/S 1: CPT | Performed by: ANESTHESIOLOGY

## 2017-11-02 PROCEDURE — 64484 NJX AA&/STRD TFRM EPI L/S EA: CPT | Performed by: ANESTHESIOLOGY

## 2017-11-02 PROCEDURE — 63600175 PHARM REV CODE 636 W HCPCS: Performed by: ANESTHESIOLOGY

## 2017-11-02 PROCEDURE — 25500020 PHARM REV CODE 255: Performed by: ANESTHESIOLOGY

## 2017-11-02 RX ORDER — FENTANYL CITRATE 50 UG/ML
INJECTION, SOLUTION INTRAMUSCULAR; INTRAVENOUS
Status: DISCONTINUED | OUTPATIENT
Start: 2017-11-02 | End: 2017-11-02 | Stop reason: HOSPADM

## 2017-11-02 RX ORDER — LIDOCAINE HYDROCHLORIDE 10 MG/ML
INJECTION INFILTRATION; PERINEURAL
Status: DISCONTINUED | OUTPATIENT
Start: 2017-11-02 | End: 2017-11-02 | Stop reason: HOSPADM

## 2017-11-02 RX ORDER — LIDOCAINE HYDROCHLORIDE 10 MG/ML
INJECTION, SOLUTION EPIDURAL; INFILTRATION; INTRACAUDAL; PERINEURAL
Status: DISCONTINUED | OUTPATIENT
Start: 2017-11-02 | End: 2017-11-02 | Stop reason: HOSPADM

## 2017-11-02 RX ORDER — DEXAMETHASONE SODIUM PHOSPHATE 100 MG/10ML
INJECTION INTRAMUSCULAR; INTRAVENOUS
Status: DISCONTINUED | OUTPATIENT
Start: 2017-11-02 | End: 2017-11-02 | Stop reason: HOSPADM

## 2017-11-02 RX ORDER — MIDAZOLAM HYDROCHLORIDE 1 MG/ML
INJECTION INTRAMUSCULAR; INTRAVENOUS
Status: DISCONTINUED | OUTPATIENT
Start: 2017-11-02 | End: 2017-11-02 | Stop reason: HOSPADM

## 2017-11-02 RX ORDER — SODIUM CHLORIDE 9 MG/ML
500 INJECTION, SOLUTION INTRAVENOUS CONTINUOUS
Status: DISCONTINUED | OUTPATIENT
Start: 2017-11-02 | End: 2017-11-02 | Stop reason: HOSPADM

## 2017-11-02 RX ADMIN — SODIUM CHLORIDE 500 ML: 0.9 INJECTION, SOLUTION INTRAVENOUS at 11:11

## 2017-11-02 NOTE — DISCHARGE SUMMARY
Discharge Diagnosis:Lumbar radiculopathy [M54.16] DJD Lumbar spine  Condition on Discharge: Stable.  Diet on Discharge: Same as before.  Activity: as per instruction sheet.  Discharge to: Home with a responsible adult.  Follow up: as per Discharge instructions

## 2017-11-02 NOTE — DISCHARGE INSTRUCTIONS
Home Care Instructions Pain Management:    1. DIET:   You may resume your normal diet today.   2. BATHING:   You may shower with luke warm water.  3. DRESSING:   You may remove your bandage today.   4. ACTIVITY LEVEL:   You may resume your normal activities 24 hrs after your procedure.  5. MEDICATIONS:   You may resume your normal medications today.   6. SPECIAL INSTRUCTIONS:   No heat to the injection site for 24 hrs including, bath or shower, heating pad, moist heat, or hot tubs.    Use ice pack to injection site for any pain or discomfort.  Apply ice packs for 20 minute intervals as needed.   If you have received any sedatives by mouth today you may not drive for 12 hours.    If you have received any sedation through your IV, you may not drive for 24 hrs.     PLEASE CALL YOUR DOCTOR IF:  1. Redness or swelling around the injection site.  2. Fever of 101 degrees  3. Drainage (pus) from the injection site.  4. For any continuous bleeding (some dried blood over the incision is normal.)    FOR EMERGENCIES:   If any unusual problems or difficulties occur during clinic hours, call [phone] or [phone].   Procedural Sedation  Procedural sedation is medicine to ease discomfort, pain, and anxiety during a procedure. The medicine is often given through an intravenous (IV) line in your arm or hand. In some cases, the medicine may be taken by mouth or inhaled. While you are under sedation, you will likely be awake. But you may not remember anything afterward.  Why procedural sedation is used  Sedation is used for many types of procedures. The goal is to reduce pain, anxiety, and stressful memories of a procedure. It can also help your health care provider treat you. For example, having a broken bone fixed may be easier if you feel relaxed.  Procedural sedation is used only for short, basic procedures. It is not used for complex surgeries. Some procedures that use this type of sedation include:  · Dental surgery  · Breast  biopsy, to take a sample of breast tissue  · Endoscopy, to look at gastrointestinal problems  · Bronchoscopy, to check for lung problems  · Bone or joint realignment, to fix a broken bone or dislocated joint  · Minor foot or skin surgery  · Electrical cardioversion, to restore a normal heart rhythm  · Lumbar puncture, to assess neurological disease  Risks of procedural sedation  Procedural sedation has some risks and possible side effects, such as:  · Headache  · Nausea and vomiting  · Unpleasant memory of the procedure  · Lowered rate of breathing  · Changes in heart rate and blood pressure (rare)  · Inhalation of stomach contents into your lungs (rare)  Side effects will likely go away shortly after the procedure. Your health care team will watch your heart rate and breathing during your sedation. This is to help prevent problems.  Your own risks may vary based on your age and your overall health. They also depend on the type of sedation you are given. Talk with your health care provider about the risks that apply most to you.  Getting ready for procedural sedation  Talk with your health care provider how to get ready for your procedure. Tell him or her about all the medicines you take. This includes over-the-counter medicines such as ibuprofen. It also includes vitamins, herbs, and other supplements. You may need to stop taking some medicines before the procedure, such as blood thinners and aspirin. If you smoke, you may need to stop. Talk with your health care provider if you need help to stop smoking.  Tell your health care provider if you:  · Have had any problems in the past with sedation or anesthesia  · Have had any recent changes in your health, such as an infection or fever  · Are pregnant or think you may be pregnant  Also, make sure to:  · Ask a family member or friend to take you home after the procedure. You cannot drive on the day you receive sedation.  · Not eat or drink after midnight the night  before your procedure, if advised.  · Follow all other instructions from your health care provider.  During your procedural sedation  You may have your procedure in a hospital or a medical clinic. Sedation is done by a trained health care provider. In general, you can expect the following:  · You will be given medicine through an IV line in your arm or hand. Or you may receive a shot. The medicine may also be given by mouth. Or you may inhale it through a mask.  · If you receive medicine through an IV, you may feel the effects very quickly. You will start to feel relaxed and drowsy.  · During the procedure, your heart rate, breathing, and blood pressure will be closely watched. Your breathing and blood pressure may decrease a little. But you will likely not need help with your breathing. You may receive a little extra oxygen through a mask.  · You will probably be awake the entire time. If you do fall asleep, you should be easy to wake up, if needed. You should feel little or no pain.  · When your procedure is over, the sedative medicine will be stopped.  After your procedural sedation  You will begin to feel more awake and aware. But you will likely be drowsy for a while afterward. You will be closely watched as you become more alert. You may have a faint memory of the procedure. Or you may not remember it at all.  You should be able to return home within an hour or two after your procedure. Plan to have someone stay with you for a few hours. Side effects such as headache and nausea may go away quickly. Tell your health care provider if they continue.  Dont drive or make any important decisions for at least 24 hours. Be sure to follow all after-care instructions.      When to call your health care provider  Have someone call your health care provider right away if you have any of these:  · Drowsiness that gets worse  · Weakness or dizziness that gets worse  · Repeated vomiting  · You cant be awakened   Date Last  Reviewed: 2/6/2015  © 8772-6075 [employer], [employer]. [address] [place] [address], [place] [address]. All rights reserved. This information is not intended as a substitute for professional medical care. Always follow your healthcare professional's instructions.

## 2017-11-02 NOTE — H&P
Patient ID: Sundar Montanez is a 67 y.o. male.     Chief Complaint: Low-back Pain     Referred by: Vijay West MD      HPI: Patient continues with low back pain similar in character, distribution and intensity as before. He denies any bladder or bowel incontinence. See previous note for more details. He is here today for his scheduled RIGHT TFESI at L5 and S1.     Past Medical History:   Diagnosis Date    Hypertension        Past Surgical History:   Procedure Laterality Date    CORONARY ANGIOPLASTY WITH STENT PLACEMENT      SPINAL FUSION       Review of patient's allergies indicates:  No Known Allergies    Current Facility-Administered Medications   Medication Dose Route Frequency Provider Last Rate Last Dose    0.9%  NaCl infusion  500 mL Intravenous Continuous Leodan Fernandez, DO 25 mL/hr at 11/02/17 1120 500 mL at 11/02/17 1120       Family History   Problem Relation Age of Onset    Heart attack Mother     Alcohol abuse Father     Breast cancer Sister     Breast cancer Sister        Social History     Social History    Marital status:      Spouse name: N/A    Number of children: N/A    Years of education: N/A     Occupational History    Not on file.     Social History Main Topics    Smoking status: Former Smoker     Quit date: 5/4/1972    Smokeless tobacco: Never Used    Alcohol use No    Drug use: No    Sexual activity: Not on file     Other Topics Concern    Not on file     Social History Narrative    No narrative on file     Low-back Pain   Pertinent negatives include no chest pain, fever, headaches or weight loss.       Interventional Pain History  3x injection in [place] (Gil Rob)- unsure of type- did not help     Review of Systems   Constitution: Negative for chills, fever, malaise/fatigue, weight gain and weight loss.   HENT: Negative for ear pain, headaches and hoarse voice.    Eyes: Negative for blurred vision, pain and visual disturbance.   Cardiovascular: Negative  "for chest pain, dyspnea on exertion and irregular heartbeat.        Hypertension   Respiratory: Negative for cough, shortness of breath and wheezing.    Endocrine: Negative for cold intolerance and heat intolerance.   Hematologic/Lymphatic: Negative for adenopathy and bleeding problem. Does not bruise/bleed easily.   Skin: Negative for color change, itching and rash.   Musculoskeletal: Positive for back pain. Negative for neck pain.   Gastrointestinal: Negative for change in bowel habit, diarrhea, hematemesis, hematochezia, melena and vomiting.   Genitourinary: Negative for flank pain, frequency, hematuria and urgency.   Neurological: Negative for difficulty with concentration, dizziness, loss of balance and seizures.   Psychiatric/Behavioral: Negative for altered mental status, depression and suicidal ideas. The patient is not nervous/anxious.    Allergic/Immunologic: Negative for HIV exposure.   All other systems reviewed and are negative.            Objective:      /74 (BP Location: Right arm, Patient Position: Lying)   Pulse 62   Temp 97.7 °F (36.5 °C) (Oral)   Resp 18   Ht 5' 11" (1.803 m)   Wt 86.2 kg (190 lb)   SpO2 96%   BMI 26.50 kg/m²   PHYSICAL EXAMINATION:  GEN:  Well developed, well nourished.  No acute distress. Normal pain behavior.  HEENT:  No trauma.  Mucous membranes moist.  Nares patent bilaterally.  PSYCH: Normal affect. Thought content appropriate.  CHEST:  Breathing symmetric.  No audible wheezing.  ABD: Soft, non-tender, non-distended.  SKIN:  Warm, pink, dry.  No rash on exposed areas.    EXT:  No cyanosis, clubbing, or edema.  No color change or changes in nail or hair growth.  NEURO/MUSCULOSKELETAL:  Fully alert, oriented, and appropriate. Speech normal rachel. No cranial nerve deficits.   Gait: Normal.  No trendelenburg sign bilaterally.   Strength: 5/5 motor strength throughout bilateral upper and lower extremities myotomes.   Sensory: No sensory deficit in the lower " extremities dermatomes.   Reflexes:  2+ and symmetric throughout.  Downgoing Babinski's bilaterally, negative euceda's.  No clonus or spasticity.     L-Spine:  Normal ROM with pain on ext. +facet loading bilaterally.  - SLR bilaterally.  - femoral stretch bilaterally. - Milgram's test. - Well-SLR. - Slump test.  TTP over lumbar paraspinals.     SI Joint/Hip: +ANAMARIA bilaterally. +Gaenslen's bilaterally.  - FADIR bilaterally.  TTP over bilateral SI joints. No TTP over hips, piriformis muscles, or GTB.        Assessment:            Encounter Diagnoses   Name Primary?    Lumbar spondylosis Yes    Facet arthritis of lumbar region      DDD (degenerative disc disease), lumbar            Plan:       Sundar was seen today for low-back pain.      Lumbar spondylosis     Facet arthritis of lumbar region     DDD (degenerative disc disease), lumbar   We discussed with the patient the assessment and recommendations. The following is the plan we agreed on:  1. Proceed with right TFESI at L5 and S1.   2. F/u in two weeks or PRN.     Leodan Fernandez DO  Pain Medicine Fellow

## 2017-11-02 NOTE — OP NOTE
Date of Service: 11/02/2017    PCP: Kenny Leavitt Sr, MD    Referring Physician:    Time-out taken to identify patient and procedure side prior to starting the procedure.   I attest that I have reviewed the patient's home medications prior to the procedure and no contraindication have been identified. I  re-evaluated the patient after the patient was positioned for the procedure in the procedure room immediately before the procedural time-out. The vital signs are current and represent the current state of the patient which has not significantly changed since the preprocedure assessment.                                                           PROCEDURE: Right L5 and S1 transforaminal epidural steroid injection under fluoroscopy    REASON FOR PROCEDURE: Lumbar DJD with radiculopathy,  Lumbar radiculopathy [M54.16]    PHYSICIAN: Jamshid Knight MD  ASSISTANTS:DO Stanislav Shanks MD    MEDICATIONS INJECTED:  Preservative-free dexamethasone 10mg, Xylocaine 1% MPF 3-5ml. 3ml per level. Preservative free, sterile normal saline is used to get larger volume as needed.  LOCAL ANESTHETIC INJECTED:  Xylocaine 1% 9ml with Sodium Bicarbonate 1ml. 3ml per site.    SEDATION MEDICATIONS: Fentanyl 25 mg, Versed 2 mg  ESTIMATED BLOOD LOSS:  None.    COMPLICATIONS:  None.    TECHNIQUE:   Laying in a prone position, the patient was prepped and draped in the usual sterile fashion using ChloraPrep and fenestrated drape.  The area to be injected was determined under fluoroscopic guidance.  Local anesthetic was given by raising a wheel and going down to the hub of a 27-gauge 1.25 inch needle.  The 3.5inch 22-gauge spinal needle was introduced towards the transverse process of each above named nerve root level.  The needle was walked medially then hinged into the neural foramen.  Omnipaque was injected to confirm appropriate placement and that there was no vascular runoff.  The medication was then injected after applying  negative pressure. The patient tolerated the procedure well.    PAIN BEFORE THE PROCEDURE: 7/10.    PAIN AFTER THE PROCEDURE: 1/10.    The patient was monitored after the procedure.  Patient was given post procedure and discharge instructions to follow at home.  We will see the patient back in two weeks or the patient may call to inform of status. The patient was discharged in a stable condition.

## 2018-04-02 ENCOUNTER — TELEPHONE (OUTPATIENT)
Dept: SPINE | Facility: CLINIC | Age: 68
End: 2018-04-02

## 2018-04-02 NOTE — TELEPHONE ENCOUNTER
Patient notified of upcoming follow  Up appointment on 5/31 9:30 am with Dr. West     ----- Message from Ninfa Solomon sent at 4/2/2018 10:12 AM CDT -----  Contact: pt @ [phone]  Calling to schedule and appt with Dr. West. Please call.

## 2018-05-31 ENCOUNTER — HOSPITAL ENCOUNTER (OUTPATIENT)
Dept: RADIOLOGY | Facility: HOSPITAL | Age: 68
Discharge: HOME | End: 2018-05-31
Attending: STUDENT IN AN ORGANIZED HEALTH CARE EDUCATION/TRAINING PROGRAM
Payer: COMMERCIAL

## 2018-05-31 ENCOUNTER — OFFICE VISIT (OUTPATIENT)
Dept: NEUROSURGERY | Facility: CLINIC | Age: 68
End: 2018-05-31
Payer: COMMERCIAL

## 2018-05-31 VITALS
SYSTOLIC BLOOD PRESSURE: 92 MMHG | HEART RATE: 80 BPM | BODY MASS INDEX: 26.46 KG/M2 | TEMPERATURE: 98 F | HEIGHT: 71 IN | DIASTOLIC BLOOD PRESSURE: 59 MMHG | WEIGHT: 189 LBS

## 2018-05-31 DIAGNOSIS — M54.6 THORACIC SPINE PAIN: ICD-10-CM

## 2018-05-31 DIAGNOSIS — M54.6 THORACIC SPINE PAIN: Primary | ICD-10-CM

## 2018-05-31 DIAGNOSIS — G95.9 CERVICAL MYELOPATHY: ICD-10-CM

## 2018-05-31 PROCEDURE — 72128 CT CHEST SPINE W/O DYE: CPT | Mod: TC

## 2018-05-31 PROCEDURE — 99214 OFFICE O/P EST MOD 30 MIN: CPT | Mod: S$GLB,,, | Performed by: NEUROLOGICAL SURGERY

## 2018-05-31 PROCEDURE — 72128 CT CHEST SPINE W/O DYE: CPT | Mod: 26,,, | Performed by: RADIOLOGY

## 2018-05-31 PROCEDURE — 72082 X-RAY EXAM ENTIRE SPI 2/3 VW: CPT | Mod: 26,,, | Performed by: RADIOLOGY

## 2018-05-31 PROCEDURE — 99999 PR PBB SHADOW E&M-EST. PATIENT-LVL V: CPT | Mod: PBBFAC,,, | Performed by: NEUROLOGICAL SURGERY

## 2018-05-31 PROCEDURE — 72082 X-RAY EXAM ENTIRE SPI 2/3 VW: CPT | Mod: TC

## 2018-05-31 RX ORDER — ATORVASTATIN CALCIUM 40 MG/1
TABLET, FILM COATED ORAL
COMMUNITY
Start: 2018-05-08 | End: 2018-05-31 | Stop reason: DRUGHIGH

## 2018-05-31 RX ORDER — MELOXICAM 15 MG/1
TABLET ORAL
COMMUNITY
Start: 2018-05-17 | End: 2023-09-14 | Stop reason: ALTCHOICE

## 2018-05-31 RX ORDER — TAMSULOSIN HYDROCHLORIDE 0.4 MG/1
CAPSULE ORAL
COMMUNITY
Start: 2018-05-15 | End: 2023-09-14 | Stop reason: ALTCHOICE

## 2018-05-31 RX ORDER — SULFAMETHOXAZOLE AND TRIMETHOPRIM 800; 160 MG/1; MG/1
TABLET ORAL
COMMUNITY
Start: 2018-05-17 | End: 2023-09-14 | Stop reason: ALTCHOICE

## 2018-05-31 RX ORDER — HYDROCHLOROTHIAZIDE 25 MG/1
TABLET ORAL
COMMUNITY
Start: 2018-05-15 | End: 2023-09-14 | Stop reason: ALTCHOICE

## 2018-05-31 RX ORDER — TRAZODONE HYDROCHLORIDE 150 MG/1
TABLET ORAL
COMMUNITY
Start: 2018-04-03 | End: 2023-09-14 | Stop reason: ALTCHOICE

## 2018-05-31 RX ORDER — HYDROCODONE BITARTRATE AND ACETAMINOPHEN 7.5; 325 MG/1; MG/1
TABLET ORAL
COMMUNITY
Start: 2018-04-03 | End: 2023-09-14 | Stop reason: ALTCHOICE

## 2018-05-31 NOTE — PROGRESS NOTES
"CHIEF COMPLAINT:  Follow up after injections    I, Deangelo Suazo, attest that this documentation has been prepared under the direction and in the presence of Vijay West MD.    HPI:  Sundar Montanez is a 68 y.o.  male who presents today for follow up evaluation of low back pain after injections. Pt reports that he has experienced no relief following ESIs and facet joint inject injections with Dr. Knight. Pt reports back continued low back pain. He denies shooting leg pain but states that his back pain reaches his right buttock. He now also notes longstanding constant left-sided and midline mid back pain worsening 2-3 months ago, which is overshadowing his low back pain. He states that his low back pain is a 6-8/10 and his mid back pain is 10/10 in severityHe describes his pain as dull, aching pain. His mid back pain prevents him from taking deep breaths. Pt denies chest pain.  He also notes pain when he touches his left lateral ribcage at the same level as his back pain. Nothing alleviates his back pain. He was given Norco but discontinued due to upset stomach. He notes imbalance stating that he "walks like a drunk man". He denies any outright falls but often stumbles and catches himself. Pt denies hand clumsiness or numbness/tingling. He also denies b/b dysfunction but states that he takes Flomax and has been catheterized twice the in the past few years. He does feel like he has been urinating more frequently. Pt reports 30 lbs of weight loss in the past 2 months. He also notes constant fatigue. Pt has participated in physical therapy. Pt works on a [employer].       Review of patient's allergies indicates:  No Known Allergies    Past Medical History:   Diagnosis Date    Hypertension      Past Surgical History:   Procedure Laterality Date    CORONARY ANGIOPLASTY WITH STENT PLACEMENT      SPINAL FUSION       Family History   Problem Relation Age of Onset    Heart attack Mother     Alcohol abuse Father     " Breast cancer Sister     Breast cancer Sister      Social History   Substance Use Topics    Smoking status: Former Smoker     Quit date: 5/4/1972    Smokeless tobacco: Former User    Alcohol use No        Review of Systems   Constitutional: Positive for fatigue and unexpected weight change (30 lbs in past 2 months).   HENT: Negative.    Eyes: Negative.    Respiratory: Positive for shortness of breath (secondary to mid back pain).    Cardiovascular: Negative.    Gastrointestinal: Negative.    Endocrine: Negative.    Genitourinary: Negative.    Musculoskeletal: Positive for back pain (mid and low back pain) and myalgias (right buttock). Negative for gait problem and neck pain.        Lateral ribcage tenderness at level of mid back pain   Skin: Negative.    Allergic/Immunologic: Negative.    Neurological: Negative for weakness, light-headedness, numbness and headaches.        Imbalance   Hematological: Negative.    Psychiatric/Behavioral: Negative.        OBJECTIVE:   Vital Signs:  Temp: 97.8 °F (36.6 °C) (05/31/18 0848)  Pulse: 80 (05/31/18 0848)  BP: (!) 92/59 (05/31/18 0848)    Physical Exam:    Vital signs: All nursing notes and vital signs reviewed -- afebrile, vital signs stable.  Constitutional: Patient sitting comfortably in chair. Appears well developed and well nourished.  Skin: Exposed areas are intact without abnormal markings, rashes or other lesions.  Four well healed incisions in the lumbar region and left flank.   HEENT: Normocephalic. Normal conjunctivae.  Cardiovascular: Normal rate and regular rhythm.  Respiratory: Chest wall rises and falls symmetrically, without signs of respiratory distress.  Abdomen: Soft and non-tender.  Extremities: Warm and without edema. Calves supple, non-tender.  Psych/Behavior: Normal affect.    Neurological:    Mental status: Alert and oriented. Conversational and appropriate.       Cranial Nerves: Grossly intact.     Motor:    Upper:  Deltoids Triceps Biceps WE WF      R 5/5 5/5 5/5 5/5 5/5 5/5    L 5/5 5/5 5/5 5/5 5/5 5/5      Lower:  HF KE KF DF PF EHL    R 5/5 5/5 5/5 5/5 5/5 5/5    L 5/5 5/5 5/5 5/5 5/5 5/5     Sensory: Intact sensation to light touch in all extremities. Romberg negative.    Reflexes:          DTR: 2+ symmetrically throughout.     Flores's: Positive, right.      Babinski's: Negative.     Clonus: Negative.    Cerebellar: Finger-to-nose and rapid alternating movements normal. Gait stable, fluid.    Spine:    Posture: Slightly forward stooped posture. Shoulders and hips even. No obvious leg length discrepancy. No scapula winging. Rightward truncal shift.     Bending: Full ROM with forward, back and lateral bending. No rib prominence with forward bend.    Cervical:      ROM: Full with flexion, extension, lateral rotation and ear-to-shoulder bend.      Midline TTP: Negative.     Spurling's test: Negative.     Lhermitte's: Negative.    Thoracic:     Midline TTP: Negative     Left paraspinal TTP    Lumbar:     Midline TTP: Negative     Straight Leg Test: Negative     Crossed Straight Leg Test: Negative     Sciatic notch tenderness: Negative.    Other:     SI joint TTP: Negative.     Greater trochanter TTP: Negative.     Tenderness with external/internal hip rotation: Negative.    Diagnostic Results:  All imaging was independently reviewed by me.    CT T-spine, dated 05/31/2018:  1. Diffuse spondylosis   2. Otherwise unremarkable    Scoliosis X-ray, dated 05/31/2018:   1. Mild lumbar scoliosis   2. Acceptable global alignment  3. L4/5 posterior hardware  4. No obvious lytic lesions or fractures       ASSESSMENT/PLAN:     Sundar Montanez is s/p right L5/S1 transforaminal injection. He presents for follow up of low back pian. The injection did not work. In addition he has mid thoracic pain with left sided radiculopathy. Pt also noted 30 lb weight loss in 2 months and remote history of smoking. He has tenderness to palpation and percussion of the mid  thoracic spine as well as gait imbalance. At this time, I'm concerned for possible underlying malignancy versus myelopathy. Given the presence of thoracic pain as well as Flores's sign, we'll need an MRI T-spine. Given concerns for underlying malignancy we will obtain a CT Chest Abdomen Pelvis. He will follow up after all of this is complete.      The patient understands and agrees with the plan of care. All questions were answered.     1. MRI T-spine  2. CT Chest Abdomen Pelvis  3. RTC pending #1-2      I, Dr. Vijay West personally performed the services described in this documentation. All medical record entries made by the scribe, Deangelo Suazo, were at my direction and in my presence.  I have reviewed the chart and agree that the record reflects my personal performance and is accurate and complete.      Vijay West M.D.  Department of Neurosurgery  [facility]      .

## 2018-07-10 ENCOUNTER — PATIENT MESSAGE (OUTPATIENT)
Dept: NEUROSURGERY | Facility: CLINIC | Age: 68
End: 2018-07-10

## 2018-07-17 ENCOUNTER — TELEPHONE (OUTPATIENT)
Dept: NEUROSURGERY | Facility: CLINIC | Age: 68
End: 2018-07-17

## 2018-07-17 NOTE — TELEPHONE ENCOUNTER
DEBBIE pt to reschedule Dr. West appt to 7/30/18, scans rescheduled for same day. Pt verbalizes understanding.

## 2021-12-13 LAB — CRC RECOMMENDATION EXT: NORMAL

## 2023-01-05 LAB
CHOLESTEROL, TOTAL: 159 (ref 25–200)
HBA1C MFR BLD: 6 % (ref 4–6)
HDLC SERPL-MCNC: 44 MG/DL (ref 23–92)
LDLC SERPL CALC-MCNC: 85 MG/DL (ref 0–100)
TRIGL SERPL-MCNC: 151 MG/DL (ref 10–150)
VLDL CHOLESTEROL: 30.2 (ref 8–18)

## 2023-06-02 PROBLEM — N40.0 BPH (BENIGN PROSTATIC HYPERPLASIA): Chronic | Status: ACTIVE | Noted: 2023-06-02

## 2023-06-02 PROBLEM — E29.1 HYPOGONADISM IN MALE: Chronic | Status: ACTIVE | Noted: 2023-06-02

## 2023-06-02 PROBLEM — R91.1 LUNG NODULE: Chronic | Status: ACTIVE | Noted: 2023-06-02

## 2023-06-02 PROBLEM — I25.10 CAD S/P PERCUTANEOUS CORONARY ANGIOPLASTY: Chronic | Status: ACTIVE | Noted: 2023-06-02

## 2023-06-02 PROBLEM — F41.9 ANXIETY: Chronic | Status: ACTIVE | Noted: 2023-06-02

## 2023-06-02 PROBLEM — E03.9 HYPOTHYROID: Chronic | Status: ACTIVE | Noted: 2023-06-02

## 2023-06-02 PROBLEM — Z98.61 CAD S/P PERCUTANEOUS CORONARY ANGIOPLASTY: Chronic | Status: ACTIVE | Noted: 2023-06-02

## 2023-06-02 PROBLEM — R73.03 PREDIABETES: Chronic | Status: ACTIVE | Noted: 2023-06-02

## 2023-06-12 ENCOUNTER — CLINICAL SUPPORT (OUTPATIENT)
Dept: FAMILY MEDICINE | Facility: CLINIC | Age: 73
End: 2023-06-12
Payer: MEDICARE

## 2023-06-12 VITALS
HEART RATE: 80 BPM | SYSTOLIC BLOOD PRESSURE: 118 MMHG | WEIGHT: 189 LBS | TEMPERATURE: 97 F | DIASTOLIC BLOOD PRESSURE: 68 MMHG | BODY MASS INDEX: 26.36 KG/M2

## 2023-06-12 DIAGNOSIS — E29.1 HYPOGONADISM IN MALE: Primary | ICD-10-CM

## 2023-06-12 DIAGNOSIS — M51.36 DDD (DEGENERATIVE DISC DISEASE), LUMBAR: ICD-10-CM

## 2023-06-12 DIAGNOSIS — G47.00 INSOMNIA, UNSPECIFIED TYPE: Primary | ICD-10-CM

## 2023-06-12 DIAGNOSIS — G47.00 INSOMNIA, UNSPECIFIED TYPE: ICD-10-CM

## 2023-06-12 PROCEDURE — 96372 THER/PROPH/DIAG INJ SC/IM: CPT | Mod: S$GLB,,, | Performed by: FAMILY MEDICINE

## 2023-06-12 PROCEDURE — 96372 PR INJECTION,THERAP/PROPH/DIAG2ST, IM OR SUBCUT: ICD-10-PCS | Mod: S$GLB,,, | Performed by: FAMILY MEDICINE

## 2023-06-12 RX ORDER — CELECOXIB 100 MG/1
CAPSULE ORAL
COMMUNITY
End: 2023-06-12 | Stop reason: SDUPTHER

## 2023-06-12 RX ORDER — ESZOPICLONE 3 MG/1
3 TABLET, FILM COATED ORAL NIGHTLY
Qty: 30 TABLET | Refills: 2 | Status: SHIPPED | OUTPATIENT
Start: 2023-06-16 | End: 2023-09-14 | Stop reason: SDUPTHER

## 2023-06-12 RX ORDER — ESZOPICLONE 3 MG/1
3 TABLET, FILM COATED ORAL NIGHTLY
Qty: 30 TABLET | Refills: 2 | Status: CANCELLED | OUTPATIENT
Start: 2023-06-12

## 2023-06-12 RX ORDER — CELECOXIB 100 MG/1
CAPSULE ORAL
Qty: 90 CAPSULE | Refills: 0 | Status: SHIPPED | OUTPATIENT
Start: 2023-06-12 | End: 2023-08-31 | Stop reason: SDUPTHER

## 2023-06-12 RX ORDER — TESTOSTERONE CYPIONATE 200 MG/ML
200 INJECTION, SOLUTION INTRAMUSCULAR ONCE
Status: COMPLETED | OUTPATIENT
Start: 2023-06-12 | End: 2023-06-12

## 2023-06-12 RX ADMIN — TESTOSTERONE CYPIONATE 200 MG: 200 INJECTION, SOLUTION INTRAMUSCULAR at 11:06

## 2023-07-11 ENCOUNTER — TELEPHONE (OUTPATIENT)
Dept: FAMILY MEDICINE | Facility: CLINIC | Age: 73
End: 2023-07-11
Payer: MEDICARE

## 2023-07-11 PROBLEM — M54.40 LOW BACK PAIN WITH SCIATICA: Status: ACTIVE | Noted: 2023-07-11

## 2023-07-11 PROBLEM — G47.00 INSOMNIA: Status: ACTIVE | Noted: 2023-07-11

## 2023-07-12 ENCOUNTER — CLINICAL SUPPORT (OUTPATIENT)
Dept: FAMILY MEDICINE | Facility: CLINIC | Age: 73
End: 2023-07-12
Payer: MEDICARE

## 2023-07-12 VITALS
SYSTOLIC BLOOD PRESSURE: 136 MMHG | WEIGHT: 192.38 LBS | DIASTOLIC BLOOD PRESSURE: 72 MMHG | BODY MASS INDEX: 26.83 KG/M2 | HEART RATE: 86 BPM

## 2023-07-12 DIAGNOSIS — E78.2 MIXED HYPERLIPIDEMIA: Chronic | ICD-10-CM

## 2023-07-12 DIAGNOSIS — I10 PRIMARY HYPERTENSION: Primary | Chronic | ICD-10-CM

## 2023-07-12 DIAGNOSIS — G89.29 CHRONIC BILATERAL LOW BACK PAIN WITH BILATERAL SCIATICA: ICD-10-CM

## 2023-07-12 DIAGNOSIS — E29.1 HYPOGONADISM MALE: ICD-10-CM

## 2023-07-12 DIAGNOSIS — G47.00 INSOMNIA, UNSPECIFIED TYPE: ICD-10-CM

## 2023-07-12 DIAGNOSIS — M54.41 CHRONIC BILATERAL LOW BACK PAIN WITH BILATERAL SCIATICA: ICD-10-CM

## 2023-07-12 DIAGNOSIS — F41.9 ANXIETY: Chronic | ICD-10-CM

## 2023-07-12 DIAGNOSIS — M51.36 DDD (DEGENERATIVE DISC DISEASE), LUMBAR: ICD-10-CM

## 2023-07-12 DIAGNOSIS — M54.42 CHRONIC BILATERAL LOW BACK PAIN WITH BILATERAL SCIATICA: ICD-10-CM

## 2023-07-12 PROCEDURE — 96372 PR INJECTION,THERAP/PROPH/DIAG2ST, IM OR SUBCUT: ICD-10-PCS | Mod: ,,, | Performed by: FAMILY MEDICINE

## 2023-07-12 PROCEDURE — 96372 THER/PROPH/DIAG INJ SC/IM: CPT | Mod: ,,, | Performed by: FAMILY MEDICINE

## 2023-07-12 RX ORDER — TESTOSTERONE CYPIONATE 200 MG/ML
200 INJECTION, SOLUTION INTRAMUSCULAR
Status: COMPLETED | OUTPATIENT
Start: 2023-07-12 | End: 2023-07-12

## 2023-07-12 RX ADMIN — TESTOSTERONE CYPIONATE 200 MG: 200 INJECTION, SOLUTION INTRAMUSCULAR at 02:07

## 2023-07-12 NOTE — PROGRESS NOTES
Pt in office for nurse visit. Pt received Testosterone Injection in Left Upper Outer Glut. Pt tolerated well with no reaction.   Return in 28 days for repeat injection.  NDC :  52536-625-10  Patient Supplied

## 2023-07-18 ENCOUNTER — TELEPHONE (OUTPATIENT)
Dept: FAMILY MEDICINE | Facility: CLINIC | Age: 73
End: 2023-07-18

## 2023-07-18 DIAGNOSIS — G47.00 INSOMNIA, UNSPECIFIED TYPE: Primary | ICD-10-CM

## 2023-07-18 RX ORDER — HYDROXYZINE PAMOATE 25 MG/1
25 CAPSULE ORAL NIGHTLY PRN
Qty: 90 CAPSULE | Refills: 1 | Status: CANCELLED | OUTPATIENT
Start: 2023-07-18

## 2023-07-18 RX ORDER — HYDROXYZINE PAMOATE 25 MG/1
25 CAPSULE ORAL NIGHTLY PRN
COMMUNITY
End: 2024-02-19 | Stop reason: SDUPTHER

## 2023-07-18 NOTE — TELEPHONE ENCOUNTER
Last RX 12/27/2022  # 90  X 1   Last ov 02/2023  Next appt  (lab 07/18/2023    Results appt 07/24/2023

## 2023-07-29 ENCOUNTER — TELEPHONE (OUTPATIENT)
Dept: FAMILY MEDICINE | Facility: CLINIC | Age: 73
End: 2023-07-29
Payer: MEDICARE

## 2023-07-29 NOTE — TELEPHONE ENCOUNTER
----- Message from Reena Veras sent at 7/26/2023  3:57 PM CDT -----  Regarding: refill  Need refill for Hydroxyzine

## 2023-07-29 NOTE — TELEPHONE ENCOUNTER
Called [facility] mu[facility]ozef[facility] request for Hydroxyzine 25mg p.o. q hs #90 X1 refill per v/o order Dr. Leavitt

## 2023-08-01 DIAGNOSIS — E29.1 HYPOGONADISM IN MALE: Primary | ICD-10-CM

## 2023-08-01 RX ORDER — TESTOSTERONE CYPIONATE 200 MG/ML
200 INJECTION, SOLUTION INTRAMUSCULAR
Qty: 10 ML | Refills: 0 | Status: SHIPPED | OUTPATIENT
Start: 2023-08-01 | End: 2023-09-14

## 2023-08-01 RX ORDER — TESTOSTERONE CYPIONATE 200 MG/ML
200 INJECTION, SOLUTION INTRAMUSCULAR
COMMUNITY
End: 2023-08-01 | Stop reason: SDUPTHER

## 2023-08-01 NOTE — TELEPHONE ENCOUNTER
----- Message from Reena Veras sent at 7/31/2023  9:25 AM CDT -----  Regarding: refill  Needs testosterone refilled before next injection. 8/9/23  Isabella Navarrete

## 2023-08-09 ENCOUNTER — CLINICAL SUPPORT (OUTPATIENT)
Dept: FAMILY MEDICINE | Facility: CLINIC | Age: 73
End: 2023-08-09
Payer: MEDICARE

## 2023-08-09 VITALS
HEART RATE: 72 BPM | RESPIRATION RATE: 18 BRPM | SYSTOLIC BLOOD PRESSURE: 134 MMHG | BODY MASS INDEX: 26.78 KG/M2 | OXYGEN SATURATION: 96 % | DIASTOLIC BLOOD PRESSURE: 88 MMHG | TEMPERATURE: 97 F | WEIGHT: 192 LBS

## 2023-08-09 DIAGNOSIS — E29.1 HYPOGONADISM MALE: Primary | ICD-10-CM

## 2023-08-09 PROCEDURE — 96372 THER/PROPH/DIAG INJ SC/IM: CPT | Mod: ,,, | Performed by: FAMILY MEDICINE

## 2023-08-09 PROCEDURE — 96372 PR INJECTION,THERAP/PROPH/DIAG2ST, IM OR SUBCUT: ICD-10-PCS | Mod: ,,, | Performed by: FAMILY MEDICINE

## 2023-08-09 RX ORDER — TESTOSTERONE CYPIONATE 200 MG/ML
200 INJECTION, SOLUTION INTRAMUSCULAR
Status: COMPLETED | OUTPATIENT
Start: 2023-08-09 | End: 2023-08-09

## 2023-08-09 RX ADMIN — TESTOSTERONE CYPIONATE 200 MG: 200 INJECTION, SOLUTION INTRAMUSCULAR at 12:08

## 2023-08-09 NOTE — PROGRESS NOTES
Pt. Given Testosterone Cypionate 200 mg.IM Left gluteus vishnu /Left upper outer quadrant. Tolerated well. RTC one month for repeat injection.

## 2023-08-31 DIAGNOSIS — M51.36 DDD (DEGENERATIVE DISC DISEASE), LUMBAR: ICD-10-CM

## 2023-08-31 NOTE — TELEPHONE ENCOUNTER
----- Message from Reena Veras sent at 8/24/2023  9:27 AM CDT -----  Regarding: refill  Need refills on Celecoxib 100mg he is out of this.  Eszopiclone 3mg  Isabella Navarrete

## 2023-09-01 RX ORDER — CELECOXIB 100 MG/1
CAPSULE ORAL
Qty: 30 CAPSULE | Refills: 0 | Status: SHIPPED | OUTPATIENT
Start: 2023-09-01 | End: 2023-10-06 | Stop reason: SDUPTHER

## 2023-09-04 LAB
CHOLESTEROL, TOTAL: 158
HDLC SERPL-MCNC: 41 MG/DL
LDLC SERPL CALC-MCNC: 94 MG/DL (ref 0–160)
TRIGL SERPL-MCNC: 130 MG/DL (ref 40–160)
VLDLC SERPL-MCNC: 23 MG/DL

## 2023-09-06 ENCOUNTER — DOCUMENTATION ONLY (OUTPATIENT)
Dept: FAMILY MEDICINE | Facility: CLINIC | Age: 73
End: 2023-09-06
Payer: MEDICARE

## 2023-09-08 PROBLEM — R73.03 PREDIABETES: Status: ACTIVE | Noted: 2023-06-02

## 2023-09-08 PROBLEM — E29.1 HYPOGONADISM IN MALE: Status: ACTIVE | Noted: 2023-06-02

## 2023-09-08 PROBLEM — Z98.61 CAD S/P PERCUTANEOUS CORONARY ANGIOPLASTY: Status: ACTIVE | Noted: 2023-06-02

## 2023-09-08 PROBLEM — F41.9 ANXIETY: Status: ACTIVE | Noted: 2023-06-02

## 2023-09-08 PROBLEM — I25.10 CAD S/P PERCUTANEOUS CORONARY ANGIOPLASTY: Status: ACTIVE | Noted: 2023-06-02

## 2023-09-08 PROBLEM — R91.1 LUNG NODULE: Status: ACTIVE | Noted: 2023-06-02

## 2023-09-08 PROBLEM — E03.9 HYPOTHYROID: Status: ACTIVE | Noted: 2023-06-02

## 2023-09-08 PROBLEM — N40.0 BPH (BENIGN PROSTATIC HYPERPLASIA): Status: ACTIVE | Noted: 2023-06-02

## 2023-09-14 ENCOUNTER — OFFICE VISIT (OUTPATIENT)
Dept: FAMILY MEDICINE | Facility: CLINIC | Age: 73
End: 2023-09-14
Payer: MEDICARE

## 2023-09-14 VITALS
WEIGHT: 193.63 LBS | HEART RATE: 64 BPM | TEMPERATURE: 98 F | OXYGEN SATURATION: 96 % | DIASTOLIC BLOOD PRESSURE: 70 MMHG | BODY MASS INDEX: 29.35 KG/M2 | RESPIRATION RATE: 16 BRPM | HEIGHT: 68 IN | SYSTOLIC BLOOD PRESSURE: 130 MMHG

## 2023-09-14 DIAGNOSIS — E55.9 VITAMIN D DEFICIENCY: ICD-10-CM

## 2023-09-14 DIAGNOSIS — E78.2 MIXED HYPERLIPIDEMIA: ICD-10-CM

## 2023-09-14 DIAGNOSIS — E29.1 HYPOGONADISM IN MALE: ICD-10-CM

## 2023-09-14 DIAGNOSIS — R73.03 PREDIABETES: ICD-10-CM

## 2023-09-14 DIAGNOSIS — G47.00 INSOMNIA, UNSPECIFIED TYPE: ICD-10-CM

## 2023-09-14 PROBLEM — E03.9 HYPOTHYROID: Status: RESOLVED | Noted: 2023-06-02 | Resolved: 2023-09-14

## 2023-09-14 PROBLEM — N40.0 BPH (BENIGN PROSTATIC HYPERPLASIA): Status: RESOLVED | Noted: 2023-06-02 | Resolved: 2023-09-14

## 2023-09-14 PROCEDURE — 3288F PR FALLS RISK ASSESSMENT DOCUMENTED: ICD-10-PCS | Mod: CPTII,,, | Performed by: FAMILY MEDICINE

## 2023-09-14 PROCEDURE — 3044F PR MOST RECENT HEMOGLOBIN A1C LEVEL <7.0%: ICD-10-PCS | Mod: CPTII,,, | Performed by: FAMILY MEDICINE

## 2023-09-14 PROCEDURE — 1159F PR MEDICATION LIST DOCUMENTED IN MEDICAL RECORD: ICD-10-PCS | Mod: CPTII,,, | Performed by: FAMILY MEDICINE

## 2023-09-14 PROCEDURE — 3288F FALL RISK ASSESSMENT DOCD: CPT | Mod: CPTII,,, | Performed by: FAMILY MEDICINE

## 2023-09-14 PROCEDURE — 1160F RVW MEDS BY RX/DR IN RCRD: CPT | Mod: CPTII,,, | Performed by: FAMILY MEDICINE

## 2023-09-14 PROCEDURE — 99214 OFFICE O/P EST MOD 30 MIN: CPT | Mod: 25,,, | Performed by: FAMILY MEDICINE

## 2023-09-14 PROCEDURE — 3075F SYST BP GE 130 - 139MM HG: CPT | Mod: CPTII,,, | Performed by: FAMILY MEDICINE

## 2023-09-14 PROCEDURE — 3075F PR MOST RECENT SYSTOLIC BLOOD PRESS GE 130-139MM HG: ICD-10-PCS | Mod: CPTII,,, | Performed by: FAMILY MEDICINE

## 2023-09-14 PROCEDURE — 99214 PR OFFICE/OUTPT VISIT, EST, LEVL IV, 30-39 MIN: ICD-10-PCS | Mod: 25,,, | Performed by: FAMILY MEDICINE

## 2023-09-14 PROCEDURE — 96372 PR INJECTION,THERAP/PROPH/DIAG2ST, IM OR SUBCUT: ICD-10-PCS | Mod: ,,, | Performed by: FAMILY MEDICINE

## 2023-09-14 PROCEDURE — 3078F PR MOST RECENT DIASTOLIC BLOOD PRESSURE < 80 MM HG: ICD-10-PCS | Mod: CPTII,,, | Performed by: FAMILY MEDICINE

## 2023-09-14 PROCEDURE — 96372 THER/PROPH/DIAG INJ SC/IM: CPT | Mod: ,,, | Performed by: FAMILY MEDICINE

## 2023-09-14 PROCEDURE — 1159F MED LIST DOCD IN RCRD: CPT | Mod: CPTII,,, | Performed by: FAMILY MEDICINE

## 2023-09-14 PROCEDURE — 3044F HG A1C LEVEL LT 7.0%: CPT | Mod: CPTII,,, | Performed by: FAMILY MEDICINE

## 2023-09-14 PROCEDURE — 4010F PR ACE/ARB THEARPY RXD/TAKEN: ICD-10-PCS | Mod: CPTII,,, | Performed by: FAMILY MEDICINE

## 2023-09-14 PROCEDURE — 1160F PR REVIEW ALL MEDS BY PRESCRIBER/CLIN PHARMACIST DOCUMENTED: ICD-10-PCS | Mod: CPTII,,, | Performed by: FAMILY MEDICINE

## 2023-09-14 PROCEDURE — 1101F PR PT FALLS ASSESS DOC 0-1 FALLS W/OUT INJ PAST YR: ICD-10-PCS | Mod: CPTII,,, | Performed by: FAMILY MEDICINE

## 2023-09-14 PROCEDURE — 1101F PT FALLS ASSESS-DOCD LE1/YR: CPT | Mod: CPTII,,, | Performed by: FAMILY MEDICINE

## 2023-09-14 PROCEDURE — 3078F DIAST BP <80 MM HG: CPT | Mod: CPTII,,, | Performed by: FAMILY MEDICINE

## 2023-09-14 PROCEDURE — 4010F ACE/ARB THERAPY RXD/TAKEN: CPT | Mod: CPTII,,, | Performed by: FAMILY MEDICINE

## 2023-09-14 RX ORDER — AMITRIPTYLINE HYDROCHLORIDE 25 MG/1
50 TABLET, FILM COATED ORAL NIGHTLY
COMMUNITY
Start: 2023-09-07

## 2023-09-14 RX ORDER — DILTIAZEM HYDROCHLORIDE EXTENDED-RELEASE TABLETS 360 MG/1
1 TABLET, EXTENDED RELEASE ORAL DAILY
COMMUNITY
End: 2023-09-14 | Stop reason: SDUPTHER

## 2023-09-14 RX ORDER — TESTOSTERONE CYPIONATE 200 MG/ML
200 INJECTION, SOLUTION INTRAMUSCULAR
Qty: 10 ML | Refills: 0 | Status: SHIPPED | OUTPATIENT
Start: 2023-09-14 | End: 2023-11-14 | Stop reason: SDUPTHER

## 2023-09-14 RX ORDER — LISINOPRIL 10 MG/1
1 TABLET ORAL DAILY
COMMUNITY

## 2023-09-14 RX ORDER — TESTOSTERONE CYPIONATE 200 MG/ML
200 INJECTION, SOLUTION INTRAMUSCULAR
Status: COMPLETED | OUTPATIENT
Start: 2023-09-14 | End: 2023-09-14

## 2023-09-14 RX ORDER — OXYCODONE HYDROCHLORIDE 15 MG/1
15 TABLET ORAL EVERY 6 HOURS PRN
COMMUNITY
Start: 2023-08-25

## 2023-09-14 RX ORDER — ATORVASTATIN CALCIUM 40 MG/1
40 TABLET, FILM COATED ORAL DAILY
COMMUNITY
Start: 2023-05-17

## 2023-09-14 RX ORDER — FINASTERIDE 5 MG/1
5 TABLET, FILM COATED ORAL DAILY
COMMUNITY

## 2023-09-14 RX ORDER — ESZOPICLONE 3 MG/1
3 TABLET, FILM COATED ORAL NIGHTLY
Qty: 90 TABLET | Refills: 3 | Status: SHIPPED | OUTPATIENT
Start: 2023-09-14 | End: 2024-01-09 | Stop reason: SDUPTHER

## 2023-09-14 RX ORDER — OMEPRAZOLE 40 MG/1
40 CAPSULE, DELAYED RELEASE ORAL EVERY MORNING
COMMUNITY
Start: 2023-05-17

## 2023-09-14 RX ORDER — ACETAMINOPHEN 500 MG
5000 TABLET ORAL DAILY
Start: 2023-09-14

## 2023-09-14 RX ADMIN — TESTOSTERONE CYPIONATE 200 MG: 200 INJECTION, SOLUTION INTRAMUSCULAR at 10:09

## 2023-09-14 NOTE — PROGRESS NOTES
Patient Name: Sundar Montanez     Patient ID: [id number]     Chief Complaint: Results (Go over lab results.)      HPI:     Sundar Montanez is a 73 y.o. male here today for follow up and lab work results. Reviewed and discussed lab work results.  Patient has a history of hypogonadism and he is due for his monthly testosterone injection today.    Past Medical History:   Diagnosis Date    Albuminuria     Anxiety 2023    BPH (benign prostatic hyperplasia) 2023    CAD S/P percutaneous coronary angioplasty 2023    Chronic low back pain     GERD (gastroesophageal reflux disease)     HTN (hypertension) 2006    Hyperlipidemia 2006    Hypertension     Hypogonadism in male 2023    Hypothyroid 2023    Insomnia     Lung nodule 2023    Prediabetes 2023    Radiculopathy         Past Surgical History:   Procedure Laterality Date    ANGIOGRAM  EXTREMITY BILATERAL      back stimulator      CHOLECYSTECTOMY N/A     CORONARY ANGIOPLASTY WITH STENT PLACEMENT      INGUINAL HERNIA REPAIR      Reverse Vasectomy      SPINAL FUSION      TRANSURETHRAL RESECTION OF PROSTATE N/A     VASECTOMY          Social History     Socioeconomic History    Marital status:     Number of children: 1   Tobacco Use    Smoking status: Former     Current packs/day: 0.00     Types: Cigarettes     Quit date: 1972     Years since quittin.3    Smokeless tobacco: Former   Substance and Sexual Activity    Alcohol use: No    Drug use: No    Sexual activity: Not Currently     Partners: Female        Current Outpatient Medications   Medication Instructions    amitriptyline (ELAVIL) 50 mg, Oral, Nightly    atorvastatin (LIPITOR) 20 MG tablet No dose, route, or frequency recorded.    celecoxib (CELEBREX) 100 MG capsule celecoxib 100 mg capsule 1 CAPSULE BY MOUTH ONCE DAILY AFTER MEALS    cholecalciferol (vitamin D3) (VITAMIN D3) 5,000 Units, Oral, Daily    clopidogrel (PLAVIX) 75 mg tablet No dose,  "route, or frequency recorded.    diltiaZEM (CARDIZEM LA) 360 mg 24 hr tablet 1 capsule, Oral, Daily    diltiaZEM (TIAZAC) 360 MG CpSR No dose, route, or frequency recorded.    diltiaZEM (TIAZAC) 180 mg, Oral, Daily    eszopiclone (LUNESTA) 3 mg, Oral, Nightly    HYDROcodone-acetaminophen (NORCO) 7.5-325 mg per tablet No dose, route, or frequency recorded.    hydrocodone-acetaminophen 10-325mg (NORCO)  mg Tab No dose, route, or frequency recorded.    hydrOXYzine pamoate (VISTARIL) 25 mg, Oral, Nightly PRN    lisinopriL 10 MG tablet 1 tablet, Oral, Daily    OXAYDO 7.5 mg TbOr TK 1 T PO  QID PRN P    oxyCODONE (ROXICODONE) 15 mg, Oral, Every 6 hours PRN    testosterone cypionate (DEPOTESTOTERONE CYPIONATE) 200 mg, Intramuscular, Every 30 days       Review of patient's allergies indicates:   Allergen Reactions    Valium [diazepam]         Immunization History   Administered Date(s) Administered    COVID-19, MRNA, LN-S, PF (Pfizer) (Purple Cap) 01/22/2021, 02/12/2021, 09/30/2021    Pneumococcal Polysaccharide - 23 Valent 07/13/2016    Zoster 09/21/2015       Patient Care Team:  Kenny Leavitt Sr., MD as PCP - General (Family Medicine)     Subjective:     Review of Systems    10 point review of systems conducted, negative except as stated in the history of present illness. See HPI for details.    Objective:     Visit Vitals  /70 (BP Location: Left arm, Patient Position: Sitting, BP Method: Medium (Manual))   Pulse 64   Temp 97.8 °F (36.6 °C)   Resp 16   Ht 5' 8" (1.727 m)   Wt 87.8 kg (193 lb 9.6 oz)   SpO2 96%   BMI 29.44 kg/m²       Physical Exam  Constitutional:       Appearance: Normal appearance.   HENT:      Head: Normocephalic and atraumatic.   Cardiovascular:      Rate and Rhythm: Normal rate and regular rhythm.   Pulmonary:      Effort: Pulmonary effort is normal.      Breath sounds: Normal breath sounds.   Abdominal:      Palpations: Abdomen is soft.      Tenderness: There is no abdominal " tenderness.   Musculoskeletal:         General: No swelling or tenderness. Normal range of motion.      Cervical back: Normal range of motion and neck supple.      Right lower leg: No edema.      Left lower leg: No edema.   Lymphadenopathy:      Cervical: No cervical adenopathy.   Skin:     General: Skin is warm and dry.   Neurological:      General: No focal deficit present.      Mental Status: He is alert and oriented to person, place, and time.   Psychiatric:         Mood and Affect: Mood normal.           Assessment:       ICD-10-CM ICD-9-CM   1. Hypogonadism in male  E29.1 257.2   2. Prediabetes  R73.03 790.29   3. Mixed hyperlipidemia  E78.2 272.2   4. Insomnia, unspecified type  G47.00 780.52   5. Vitamin D deficiency  E55.9 268.9        Plan:     1. Hypogonadism in male  Overview:  Continue with Testosterone Cypionate 200 mg IM Q month.  Patient is well controlled.  Pt is presently receiving testosterone replacement Tx. because it has made a positive impact in his life & wishes to continue with this therapy. Thus far he has not had any adverse effects relates to such. He is presently receiving       Assessment & Plan:  Most recent Testosterone 601 ng/dL and Free Testosterone 81.7 ng/dL on 08/24/2023.    Orders:  -     testosterone cypionate injection 200 mg    2. Prediabetes  Overview:  Patient is diet controlled and well controlled.  Follow ADA Diet. Avoid soda, simple sweets, and limit rice/pasta/breads/starches (no more than 45-50 grams per meal).  Maintain healthy weight with goal BMI <30.  Exercise 5 times per week for 30 minutes per day.  Stressed importance of daily foot exams especially if neuropathy is present.  Patient was instructed to notify physician if they notice any sores or skin lesions on the feet.  Stressed the importance of wearing proper fitting comfortable shoes i.e. diabetic footwear.  They were instructed to always wear shoes and never go barefooted.  Stressed importance of annual  dilated eye exam.    Assessment & Plan:  Most recent Glucose 107 mg/dL on 08/24/2023.      3. Mixed hyperlipidemia  Overview:  Continue with Atorvastatin nightly.  Patient is well controlled.  Followed by Cardiology.  Stressed importance of dietary modifications. Follow a low cholesterol, low saturated fat diet with less that 200mg of cholesterol a day.  Avoid fried foods and high saturated fats (high saturated fats less than 7% of calories).  Add Flax Seed/Fish Oil supplements to diet. Increase dietary fiber.  Regular exercise can reduce LDL and raise HDL. Stressed importance of physical activity 5 times per week for 30 minutes per day.     Assessment & Plan:  Most recent LDL 94 mg/dL on 08/24/2023.      4. Insomnia, unspecified type  Overview:  Continue with Lunesta 3 mg nightly.  Patient is well controlled.  Discussed nonpharmcologic sleep inducement techniques such as light reading & self hypnosis tapes & meditation. Pharmocologic medications were also discussed including OTC as well as prescription types.    Orders:  -     eszopiclone (LUNESTA) 3 mg Tab; Take 1 tablet (3 mg total) by mouth nightly.  Dispense: 90 tablet; Refill: 3    5. Vitamin D deficiency  Overview:  Patient is not at goal.    Assessment & Plan:  Most recent Vitamin D 22.3 ng/dL on 08/24/2023.  Will start OTC Vitamin D3 5,000 units daily.    Orders:  -     cholecalciferol, vitamin D3, (VITAMIN D3) 125 mcg (5,000 unit) Tab; Take 1 tablet (5,000 Units total) by mouth once daily.        [x] Discussed lab findings with the patient.  [] Discussed diet, exercise and if appropriate, weight loss.  [x] Instructions and information, including risks and benefits of prescribed medication(s) have been reviewed with the patient and patient verbalizes understanding. Questions have been answered to the patient's satisfaction.  [] Appropriate counseling has been given regarding anxiety and depressive issues that were discussed today.  [] Any lab drawn today  will be reviewed by physician at the time it is received and appropriate recommendations bill be made and discussed with patient.     Follow up in about 1 month (around 10/14/2023) for Testosterone Cypionate Injection.   In addition to their scheduled follow up, the patient has also been instructed to follow up on as needed basis.     Future Appointments   Date Time Provider Department Center   10/16/2023 10:30 AM NURSE, [facility] REYNOLD Leavitt Sr, MD

## 2023-10-06 ENCOUNTER — TELEPHONE (OUTPATIENT)
Dept: FAMILY MEDICINE | Facility: CLINIC | Age: 73
End: 2023-10-06
Payer: MEDICARE

## 2023-10-06 DIAGNOSIS — M51.36 DDD (DEGENERATIVE DISC DISEASE), LUMBAR: ICD-10-CM

## 2023-10-06 RX ORDER — CELECOXIB 100 MG/1
CAPSULE ORAL
Qty: 30 CAPSULE | Refills: 0 | Status: SHIPPED | OUTPATIENT
Start: 2023-10-06 | End: 2023-11-10 | Stop reason: SDUPTHER

## 2023-10-06 NOTE — TELEPHONE ENCOUNTER
----- Message from Reena Veras sent at 10/6/2023  9:51 AM CDT -----  Regarding: REFILL  Refill for celebrex.  Isabella Navarrete.  today

## 2023-10-17 ENCOUNTER — CLINICAL SUPPORT (OUTPATIENT)
Dept: FAMILY MEDICINE | Facility: CLINIC | Age: 73
End: 2023-10-17
Payer: MEDICARE

## 2023-10-17 VITALS
BODY MASS INDEX: 29.13 KG/M2 | RESPIRATION RATE: 16 BRPM | HEART RATE: 75 BPM | TEMPERATURE: 98 F | WEIGHT: 192.19 LBS | SYSTOLIC BLOOD PRESSURE: 136 MMHG | HEIGHT: 68 IN | OXYGEN SATURATION: 98 % | DIASTOLIC BLOOD PRESSURE: 84 MMHG

## 2023-10-17 DIAGNOSIS — E29.1 HYPOGONADISM IN MALE: Primary | ICD-10-CM

## 2023-10-17 PROCEDURE — 96372 PR INJECTION,THERAP/PROPH/DIAG2ST, IM OR SUBCUT: ICD-10-PCS | Mod: ,,, | Performed by: FAMILY MEDICINE

## 2023-10-17 PROCEDURE — 96372 THER/PROPH/DIAG INJ SC/IM: CPT | Mod: ,,, | Performed by: FAMILY MEDICINE

## 2023-10-17 RX ORDER — CLONIDINE HYDROCHLORIDE 0.1 MG/1
TABLET ORAL
COMMUNITY

## 2023-10-17 RX ORDER — TESTOSTERONE CYPIONATE 200 MG/ML
200 INJECTION, SOLUTION INTRAMUSCULAR
Status: COMPLETED | OUTPATIENT
Start: 2023-10-17 | End: 2023-10-17

## 2023-10-17 RX ORDER — NALOXONE HYDROCHLORIDE 4 MG/.1ML
SPRAY NASAL
COMMUNITY

## 2023-10-17 RX ORDER — PANTOPRAZOLE SODIUM 40 MG/1
40 TABLET, DELAYED RELEASE ORAL DAILY PRN
COMMUNITY

## 2023-10-17 RX ADMIN — TESTOSTERONE CYPIONATE 200 MG: 200 INJECTION, SOLUTION INTRAMUSCULAR at 10:10

## 2023-10-17 NOTE — PROGRESS NOTES
Testosterone Cypionate 200 mg Im left gluteus vishnu given upper outer quadrant. Tolerated well.  Pt also requested refill on Lunesta. Rx refill pended to MD.

## 2023-11-10 DIAGNOSIS — M51.36 DDD (DEGENERATIVE DISC DISEASE), LUMBAR: ICD-10-CM

## 2023-11-10 RX ORDER — CELECOXIB 100 MG/1
CAPSULE ORAL
Qty: 30 CAPSULE | Refills: 0 | Status: SHIPPED | OUTPATIENT
Start: 2023-11-10 | End: 2023-12-12 | Stop reason: SDUPTHER

## 2023-11-10 NOTE — TELEPHONE ENCOUNTER
Request for refill (Celecoxib 100 mg )    Last refill 10/06/23   # 30   X0      Last ov 9/14/23      Next appt 11/15/23  injection

## 2023-11-14 DIAGNOSIS — E29.1 HYPOGONADISM IN MALE: ICD-10-CM

## 2023-11-14 RX ORDER — TESTOSTERONE CYPIONATE 200 MG/ML
200 INJECTION, SOLUTION INTRAMUSCULAR
Qty: 10 ML | Refills: 1 | Status: SHIPPED | OUTPATIENT
Start: 2023-11-14

## 2023-11-16 ENCOUNTER — CLINICAL SUPPORT (OUTPATIENT)
Dept: FAMILY MEDICINE | Facility: CLINIC | Age: 73
End: 2023-11-16
Payer: MEDICARE

## 2023-11-16 VITALS
HEART RATE: 78 BPM | TEMPERATURE: 98 F | DIASTOLIC BLOOD PRESSURE: 84 MMHG | SYSTOLIC BLOOD PRESSURE: 132 MMHG | BODY MASS INDEX: 29.16 KG/M2 | WEIGHT: 191.81 LBS | OXYGEN SATURATION: 97 % | RESPIRATION RATE: 18 BRPM

## 2023-11-16 DIAGNOSIS — E29.1 HYPOGONADISM IN MALE: Primary | ICD-10-CM

## 2023-11-16 PROCEDURE — 96372 THER/PROPH/DIAG INJ SC/IM: CPT | Mod: ,,, | Performed by: FAMILY MEDICINE

## 2023-11-16 PROCEDURE — 96372 PR INJECTION,THERAP/PROPH/DIAG2ST, IM OR SUBCUT: ICD-10-PCS | Mod: ,,, | Performed by: FAMILY MEDICINE

## 2023-11-16 RX ORDER — TESTOSTERONE CYPIONATE 200 MG/ML
200 INJECTION, SOLUTION INTRAMUSCULAR
Status: COMPLETED | OUTPATIENT
Start: 2023-11-16 | End: 2023-11-16

## 2023-11-16 RX ADMIN — TESTOSTERONE CYPIONATE 200 MG: 200 INJECTION, SOLUTION INTRAMUSCULAR at 10:11

## 2023-12-12 ENCOUNTER — CLINICAL SUPPORT (OUTPATIENT)
Dept: FAMILY MEDICINE | Facility: CLINIC | Age: 73
End: 2023-12-12
Payer: MEDICARE

## 2023-12-12 VITALS
OXYGEN SATURATION: 98 % | TEMPERATURE: 98 F | WEIGHT: 193 LBS | BODY MASS INDEX: 29.25 KG/M2 | RESPIRATION RATE: 16 BRPM | DIASTOLIC BLOOD PRESSURE: 82 MMHG | HEART RATE: 75 BPM | HEIGHT: 68 IN | SYSTOLIC BLOOD PRESSURE: 124 MMHG

## 2023-12-12 DIAGNOSIS — M51.36 DDD (DEGENERATIVE DISC DISEASE), LUMBAR: ICD-10-CM

## 2023-12-12 DIAGNOSIS — E29.1 HYPOGONADISM IN MALE: Primary | ICD-10-CM

## 2023-12-12 DIAGNOSIS — Z79.899 ENCOUNTER FOR LONG-TERM (CURRENT) USE OF OTHER MEDICATIONS: ICD-10-CM

## 2023-12-12 PROCEDURE — 96372 PR INJECTION,THERAP/PROPH/DIAG2ST, IM OR SUBCUT: ICD-10-PCS | Mod: ,,, | Performed by: FAMILY MEDICINE

## 2023-12-12 PROCEDURE — 96372 THER/PROPH/DIAG INJ SC/IM: CPT | Mod: ,,, | Performed by: FAMILY MEDICINE

## 2023-12-12 RX ORDER — TESTOSTERONE CYPIONATE 200 MG/ML
200 INJECTION, SOLUTION INTRAMUSCULAR
Status: COMPLETED | OUTPATIENT
Start: 2023-12-12 | End: 2023-12-12

## 2023-12-12 RX ADMIN — TESTOSTERONE CYPIONATE 200 MG: 200 INJECTION, SOLUTION INTRAMUSCULAR at 10:12

## 2023-12-13 RX ORDER — CELECOXIB 100 MG/1
100 CAPSULE ORAL DAILY
Qty: 30 CAPSULE | Refills: 2 | Status: SHIPPED | OUTPATIENT
Start: 2023-12-13

## 2024-01-05 LAB
DHEA SERPL-MCNC: 51 NG/DL
TESTOST FREE SERPL-MCNC: 1.9 PG/ML (ref 6.6–18.1)
TESTOST SERPL-MCNC: 299.6 NG/DL (ref 264–916)

## 2024-01-09 ENCOUNTER — CLINICAL SUPPORT (OUTPATIENT)
Dept: FAMILY MEDICINE | Facility: CLINIC | Age: 74
End: 2024-01-09
Payer: MEDICARE

## 2024-01-09 VITALS
HEART RATE: 67 BPM | OXYGEN SATURATION: 98 % | WEIGHT: 195 LBS | BODY MASS INDEX: 29.55 KG/M2 | TEMPERATURE: 98 F | HEIGHT: 68 IN | DIASTOLIC BLOOD PRESSURE: 72 MMHG | SYSTOLIC BLOOD PRESSURE: 126 MMHG

## 2024-01-09 DIAGNOSIS — G47.00 INSOMNIA, UNSPECIFIED TYPE: ICD-10-CM

## 2024-01-09 DIAGNOSIS — E29.1 HYPOGONADISM IN MALE: Primary | ICD-10-CM

## 2024-01-09 PROCEDURE — 96372 THER/PROPH/DIAG INJ SC/IM: CPT | Mod: ,,, | Performed by: FAMILY MEDICINE

## 2024-01-09 RX ORDER — TESTOSTERONE CYPIONATE 200 MG/ML
200 INJECTION, SOLUTION INTRAMUSCULAR
Status: COMPLETED | OUTPATIENT
Start: 2024-01-09 | End: 2024-01-09

## 2024-01-09 RX ORDER — ESZOPICLONE 3 MG/1
3 TABLET, FILM COATED ORAL NIGHTLY
Qty: 30 TABLET | Refills: 2 | Status: SHIPPED | OUTPATIENT
Start: 2024-01-09

## 2024-01-09 RX ADMIN — TESTOSTERONE CYPIONATE 200 MG: 200 INJECTION, SOLUTION INTRAMUSCULAR at 10:01

## 2024-01-09 NOTE — PROGRESS NOTES
Notified patient of lab work results of Testosterone 299.6 ng/dL is WNL and Free Testosterone is low at 1.9 ng/dL. Dr. Leavitt recommends continue with Testosterone Cypionate 200 mg IM Q month for 3 months, then recheck Testosterone and Free Testosterone lab work 2 weeks after 3rd injection. Patient verbalizes understanding.

## 2024-02-08 ENCOUNTER — CLINICAL SUPPORT (OUTPATIENT)
Dept: FAMILY MEDICINE | Facility: CLINIC | Age: 74
End: 2024-02-08
Payer: MEDICARE

## 2024-02-08 VITALS
DIASTOLIC BLOOD PRESSURE: 84 MMHG | BODY MASS INDEX: 28.25 KG/M2 | HEART RATE: 76 BPM | TEMPERATURE: 98 F | RESPIRATION RATE: 18 BRPM | WEIGHT: 185.81 LBS | SYSTOLIC BLOOD PRESSURE: 138 MMHG

## 2024-02-08 DIAGNOSIS — E29.1 HYPOGONADISM IN MALE: Primary | ICD-10-CM

## 2024-02-08 PROCEDURE — 96372 THER/PROPH/DIAG INJ SC/IM: CPT | Mod: ,,, | Performed by: FAMILY MEDICINE

## 2024-02-08 RX ORDER — TESTOSTERONE CYPIONATE 200 MG/ML
200 INJECTION, SOLUTION INTRAMUSCULAR
Status: COMPLETED | OUTPATIENT
Start: 2024-02-08 | End: 2024-02-08

## 2024-02-08 RX ADMIN — TESTOSTERONE CYPIONATE 200 MG: 200 INJECTION, SOLUTION INTRAMUSCULAR at 10:02

## 2024-02-19 DIAGNOSIS — G47.00 INSOMNIA, UNSPECIFIED TYPE: Primary | ICD-10-CM

## 2024-02-19 RX ORDER — HYDROXYZINE PAMOATE 25 MG/1
25 CAPSULE ORAL NIGHTLY PRN
Qty: 30 CAPSULE | Refills: 1 | Status: SHIPPED | OUTPATIENT
Start: 2024-02-19

## 2024-02-19 NOTE — TELEPHONE ENCOUNTER
Patient request refill on Hydroxyzine Laly 25 mg .  :Patient also on Lunesta 3 mg 1 po q HS for insomnia    Last refill on 7/18/23   # 90    Last appt  9/14/23    Patient is scheduled for 3/12/24 for Testos Injection only not on schedule to see you. Any new orders?

## 2024-03-12 ENCOUNTER — CLINICAL SUPPORT (OUTPATIENT)
Dept: FAMILY MEDICINE | Facility: CLINIC | Age: 74
End: 2024-03-12
Payer: MEDICARE

## 2024-03-12 VITALS
OXYGEN SATURATION: 97 % | WEIGHT: 195.63 LBS | BODY MASS INDEX: 29.74 KG/M2 | DIASTOLIC BLOOD PRESSURE: 86 MMHG | HEART RATE: 82 BPM | SYSTOLIC BLOOD PRESSURE: 138 MMHG

## 2024-03-12 DIAGNOSIS — E29.1 HYPOGONADISM IN MALE: Primary | ICD-10-CM

## 2024-03-12 DIAGNOSIS — E55.9 VITAMIN D DEFICIENCY: ICD-10-CM

## 2024-03-12 DIAGNOSIS — Z12.5 ENCOUNTER FOR SCREENING FOR MALIGNANT NEOPLASM OF PROSTATE: ICD-10-CM

## 2024-03-12 DIAGNOSIS — Z79.890 HORMONE REPLACEMENT THERAPY: ICD-10-CM

## 2024-03-12 DIAGNOSIS — E29.1 HYPOGONADISM MALE: ICD-10-CM

## 2024-03-12 DIAGNOSIS — R73.03 PREDIABETES: ICD-10-CM

## 2024-03-12 DIAGNOSIS — Z79.899 ENCOUNTER FOR LONG-TERM (CURRENT) USE OF OTHER MEDICATIONS: ICD-10-CM

## 2024-03-12 PROCEDURE — 96372 THER/PROPH/DIAG INJ SC/IM: CPT | Mod: ,,, | Performed by: FAMILY MEDICINE

## 2024-03-12 RX ORDER — TESTOSTERONE CYPIONATE 200 MG/ML
200 INJECTION, SOLUTION INTRAMUSCULAR
Status: COMPLETED | OUTPATIENT
Start: 2024-03-12 | End: 2024-03-12

## 2024-03-12 RX ADMIN — TESTOSTERONE CYPIONATE 200 MG: 200 INJECTION, SOLUTION INTRAMUSCULAR at 12:03

## 2024-03-27 DIAGNOSIS — M51.36 DDD (DEGENERATIVE DISC DISEASE), LUMBAR: ICD-10-CM

## 2024-03-27 NOTE — TELEPHONE ENCOUNTER
Patient takes Celebrex every other day , mostly when his hands are swollen and hurt.     Last Refill Celebrex 100 mg # 30 X2    Last ov 9/14/2023      Next appt  4/10/2024  Lab Results  and injection      Doc check the Quantity and adjust as needed

## 2024-03-27 NOTE — TELEPHONE ENCOUNTER
----- Message from Reena Veras sent at 3/26/2024  3:39 PM CDT -----  Regarding: Refken Dinh called from [employer]. They are having trouble with our faxes going through.    Needs refill for Celebrex 100 mg.

## 2024-04-04 RX ORDER — CELECOXIB 100 MG/1
100 CAPSULE ORAL EVERY OTHER DAY
Qty: 30 CAPSULE | Refills: 0 | OUTPATIENT
Start: 2024-04-04

## 2024-04-10 ENCOUNTER — OFFICE VISIT (OUTPATIENT)
Dept: FAMILY MEDICINE | Facility: CLINIC | Age: 74
End: 2024-04-10
Payer: MEDICARE

## 2024-04-10 VITALS
TEMPERATURE: 97 F | HEART RATE: 78 BPM | RESPIRATION RATE: 20 BRPM | BODY MASS INDEX: 29.95 KG/M2 | DIASTOLIC BLOOD PRESSURE: 78 MMHG | OXYGEN SATURATION: 96 % | WEIGHT: 197.63 LBS | HEIGHT: 68 IN | SYSTOLIC BLOOD PRESSURE: 138 MMHG

## 2024-04-10 DIAGNOSIS — R97.20 ELEVATED PSA: ICD-10-CM

## 2024-04-10 DIAGNOSIS — G47.00 INSOMNIA, UNSPECIFIED TYPE: ICD-10-CM

## 2024-04-10 DIAGNOSIS — Z79.1 ENCOUNTER FOR LONG-TERM USE OF NON-STEROIDAL ANTI-INFLAMMATORY MEDICATION: ICD-10-CM

## 2024-04-10 DIAGNOSIS — E29.1 HYPOGONADISM IN MALE: ICD-10-CM

## 2024-04-10 DIAGNOSIS — E55.9 VITAMIN D DEFICIENCY: ICD-10-CM

## 2024-04-10 DIAGNOSIS — I10 PRIMARY HYPERTENSION: Primary | ICD-10-CM

## 2024-04-10 DIAGNOSIS — R73.03 PREDIABETES: ICD-10-CM

## 2024-04-10 DIAGNOSIS — E78.2 MIXED HYPERLIPIDEMIA: ICD-10-CM

## 2024-04-10 DIAGNOSIS — M51.36 DDD (DEGENERATIVE DISC DISEASE), LUMBAR: ICD-10-CM

## 2024-04-10 PROCEDURE — 3075F SYST BP GE 130 - 139MM HG: CPT | Mod: CPTII,,, | Performed by: FAMILY MEDICINE

## 2024-04-10 PROCEDURE — 1101F PT FALLS ASSESS-DOCD LE1/YR: CPT | Mod: CPTII,,, | Performed by: FAMILY MEDICINE

## 2024-04-10 PROCEDURE — 1159F MED LIST DOCD IN RCRD: CPT | Mod: CPTII,,, | Performed by: FAMILY MEDICINE

## 2024-04-10 PROCEDURE — 3288F FALL RISK ASSESSMENT DOCD: CPT | Mod: CPTII,,, | Performed by: FAMILY MEDICINE

## 2024-04-10 PROCEDURE — 3078F DIAST BP <80 MM HG: CPT | Mod: CPTII,,, | Performed by: FAMILY MEDICINE

## 2024-04-10 PROCEDURE — 99215 OFFICE O/P EST HI 40 MIN: CPT | Mod: ,,, | Performed by: FAMILY MEDICINE

## 2024-04-10 RX ORDER — HYDROXYZINE PAMOATE 25 MG/1
25 CAPSULE ORAL
Qty: 30 CAPSULE | Refills: 1 | Status: CANCELLED | OUTPATIENT
Start: 2024-04-10

## 2024-04-10 RX ORDER — ESZOPICLONE 3 MG/1
3 TABLET, FILM COATED ORAL NIGHTLY
Qty: 30 TABLET | Refills: 2 | Status: SHIPPED | OUTPATIENT
Start: 2024-04-10

## 2024-04-10 RX ORDER — CELECOXIB 100 MG/1
100 CAPSULE ORAL
Qty: 30 CAPSULE | Refills: 2 | Status: SHIPPED | OUTPATIENT
Start: 2024-04-10

## 2024-04-10 NOTE — ASSESSMENT & PLAN NOTE
We are going to refill his Celebrex prescription.  I did inform him of the possible long-term side effects of NSAIDs including liver inflammation & chronic kidney disease.  I suggested he use this medication sparingly.  He voiced a clear understanding in this matter.  I told him I will continue to monitor his liver and kidney functions.

## 2024-04-10 NOTE — ASSESSMENT & PLAN NOTE
Patient's most recent PSA was slightly elevated at 5.7 on 03/26/2024.  Had a long discussion with the patient regarding his slightly elevated PSA and because of this, we are going to have to hold his testosterone replacement therapy for now.  We will refer him back to his urologist for further testing and management.

## 2024-04-10 NOTE — ASSESSMENT & PLAN NOTE
Patient's last LDL was 116 on 03/26/2024.  I had a long discussion with the patient regarding his cholesterol indicating that it was high.  Patient's cholesterol is managed by his cardiologist so I will kindly send him a copy of this lab.

## 2024-04-10 NOTE — ASSESSMENT & PLAN NOTE
Lab Results   Component Value Date    HGBA1C 6.2 (H) 03/26/2024    HGBA1C 6.0 01/04/2023     Patient is not at goal.

## 2024-04-10 NOTE — PROGRESS NOTES
Patient Name: Sundar Montanez     Patient ID: [id number]     Chief Complaint: Results (Patient here for lab results.)      HPI:     Sundar Montanez is a 74 y.o. male here today for hypertension hyperlipidemia, chronic low back pain and insomnia.     Past Medical History:   Diagnosis Date    Albuminuria     Anxiety 2023    BPH (benign prostatic hyperplasia) 2023    CAD S/P percutaneous coronary angioplasty 2023    Chronic low back pain     GERD (gastroesophageal reflux disease)     HTN (hypertension) 2006    Hyperlipidemia 2006    Hypertension     Hypogonadism in male 2023    Hypothyroid 2023    Insomnia     Lung nodule 2023    Prediabetes 2023    Radiculopathy         Past Surgical History:   Procedure Laterality Date    ANGIOGRAM  EXTREMITY BILATERAL      back stimulator      CHOLECYSTECTOMY N/A     COLONOSCOPY  2020    Dr Camara  Repeat 4 years    CORONARY ANGIOPLASTY WITH STENT PLACEMENT      INGUINAL HERNIA REPAIR      Reverse Vasectomy      SPINAL FUSION      TRANSURETHRAL RESECTION OF PROSTATE N/A     VASECTOMY          Social History     Socioeconomic History    Marital status:     Number of children: 1   Tobacco Use    Smoking status: Former     Current packs/day: 0.00     Types: Cigarettes     Quit date: 1972     Years since quittin.9    Smokeless tobacco: Never   Substance and Sexual Activity    Alcohol use: No    Drug use: No    Sexual activity: Not Currently     Partners: Female        Current Outpatient Medications   Medication Instructions    amitriptyline (ELAVIL) 50 mg, Oral, Nightly    atorvastatin (LIPITOR) 40 mg, Oral, Daily    celecoxib (CELEBREX) 100 mg, Oral, Daily, celecoxib 100 mg capsule 1 CAPSULE BY MOUTH ONCE DAILY AFTER MEALS    cholecalciferol (vitamin D3) (VITAMIN D3) 5,000 Units, Oral, Daily    diltiaZEM (TIAZAC) 360 mg, Oral, Daily    eszopiclone (LUNESTA) 3 mg, Oral, Nightly    finasteride (PROSCAR) 5  "mg, Daily    hydrOXYzine pamoate (VISTARIL) 25 mg, Oral, Nightly PRN    lisinopriL 10 MG tablet 1 tablet, Oral, Daily    naloxone (NARCAN) 4 mg/actuation Spry Nasal, As needed (PRN)    omeprazole (PRILOSEC) 40 mg, Oral, Every morning    oxyCODONE (ROXICODONE) 15 mg, Oral, Every 6 hours PRN    testosterone cypionate (DEPO-TESTOSTERONE) 200 mg, Intramuscular, Every 28 days       Review of patient's allergies indicates:   Allergen Reactions    Valium [diazepam]         Immunization History   Administered Date(s) Administered    COVID-19, MRNA, LN-S, PF (Pfizer) (Purple Cap) 01/22/2021, 02/12/2021, 09/30/2021    Pneumococcal Polysaccharide - 23 Valent 07/13/2016    Zoster 09/21/2015       Patient Care Team:  Kenny Leavitt Sr., MD as PCP - General (Family Medicine)  Gamaliel Champion MD (Anesthesiology)  Thong Jay MD as Consulting Physician (Urology)  Lj Rader MD as Consulting Physician (Cardiology)     Subjective:     Review of Systems    10 point review of systems conducted, negative except as stated in the history of present illness. See HPI for details.    Objective:     Visit Vitals  /78 (BP Location: Right arm, Patient Position: Sitting, BP Method: Medium (Manual))   Pulse 78   Temp 97.2 °F (36.2 °C)   Resp 20   Ht 5' 8" (1.727 m)   Wt 89.6 kg (197 lb 9.6 oz)   SpO2 96%   BMI 30.04 kg/m²       Physical Exam  Constitutional:       Appearance: Normal appearance.   HENT:      Head: Normocephalic and atraumatic.   Cardiovascular:      Rate and Rhythm: Normal rate and regular rhythm.   Pulmonary:      Effort: Pulmonary effort is normal.      Breath sounds: Normal breath sounds.   Abdominal:      Palpations: Abdomen is soft.      Tenderness: There is no abdominal tenderness.   Musculoskeletal:         General: No swelling.      Cervical back: Normal range of motion and neck supple.      Right lower leg: No edema.      Left lower leg: No edema.      Comments: Patient has markedly " limited range of motion of his back secondary to stiffness and pain.   Lymphadenopathy:      Cervical: No cervical adenopathy.   Skin:     General: Skin is warm and dry.   Neurological:      General: No focal deficit present.      Mental Status: He is alert and oriented to person, place, and time.      Comments: Patient is able to ambulate unaided.   Psychiatric:         Mood and Affect: Mood normal.         Assessment:       ICD-10-CM ICD-9-CM   1. Primary hypertension  I10 401.9   2. Mixed hyperlipidemia  E78.2 272.2   3. DDD (degenerative disc disease), lumbar  M51.36 722.52   4. Encounter for long-term use of non-steroidal anti-inflammatory medication  Z79.1 V58.64   5. Prediabetes  R73.03 790.29   6. Insomnia, unspecified type  G47.00 780.52   7. Elevated PSA  R97.20 790.93   8. Hypogonadism in male  E29.1 257.2   9. Vitamin D deficiency  E55.9 268.9       Plan:   1. Primary hypertension  Overview:  Current Medication: Diltiazem 360 mg one po daily; Clonidine    Low Sodium Diet (DASH Diet - Less than 2 grams of sodium per day).  Monitor blood pressure daily and log. Report consistent numbers greater than 140/90.  Maintain healthy weight with goal BMI <30. Exercise 30 minutes per day, 5 days per week.     Assessment & Plan:  Patient's hypertension is well controlled and at goal.      2. Mixed hyperlipidemia  Overview:  Continue with Atorvastatin nightly as prescribed by Cardiology  Stressed importance of dietary modifications. Follow a low cholesterol, low saturated fat diet with less that 200mg of cholesterol a day.  Avoid fried foods and high saturated fats (high saturated fats less than 7% of calories).  Add Flax Seed/Fish Oil supplements to diet. Increase dietary fiber.  Regular exercise can reduce LDL and raise HDL. Stressed importance of physical activity 5 times per week for 30 minutes per day.     Assessment & Plan:  Patient's last LDL was 116 on 03/26/2024.  I had a long discussion with the patient  regarding his cholesterol indicating that it was high.  Patient's cholesterol is managed by his cardiologist so I will kindly send him a copy of this lab.      3. DDD (degenerative disc disease), lumbar  Overview:  Patient has chronic low back pain, despite exploring all his options such as surgery, pain management, PT, and spinal stimulation.  He says Celebrex affords him significant relief and is able to stay fairly active during the day.    Assessment & Plan:  We are going to refill his Celebrex prescription.  I did inform him of the possible long-term side effects of NSAIDs including liver inflammation & chronic kidney disease.  I suggested he use this medication sparingly.  He voiced a clear understanding in this matter.  I told him I will continue to monitor his liver and kidney functions.      4. Encounter for long-term use of non-steroidal anti-inflammatory medication  Comments:  As above    5. Prediabetes  Overview:  Patient is diet controlled .  Follow ADA Diet. Avoid soda, simple sweets, and limit rice/pasta/breads/starches (no more than 45-50 grams per meal).  Maintain healthy weight with goal BMI <30.  Exercise 5 times per week for 30 minutes per day.  Stressed importance of daily foot exams especially if neuropathy is present.  Patient was instructed to notify physician if they notice any sores or skin lesions on the feet.  Stressed the importance of wearing proper fitting comfortable shoes i.e. diabetic footwear.  They were instructed to always wear shoes and never go barefooted.  Stressed importance of annual dilated eye exam.    Assessment & Plan:  Lab Results   Component Value Date    HGBA1C 6.2 (H) 03/26/2024    HGBA1C 6.0 01/04/2023     Patient is not at goal.       6. Insomnia, unspecified type  Overview:  Continue with Lunesta 3 mg nightly.  Discussed nonpharmcologic sleep inducement techniques such as light reading & self hypnosis tapes & meditation.    Assessment & Plan:  Patient's insomnia  is well controlled with the above regimen.      7. Elevated PSA  Overview:  We will continue to screen the patient for prostate cancer by performing PSAs at the proper intervals.  We are continuing to monitor patient's PSAs even at his advanced age because he is on testosterone replacement therapy.    Assessment & Plan:  Patient's most recent PSA was slightly elevated at 5.7 on 03/26/2024.  Had a long discussion with the patient regarding his slightly elevated PSA and because of this, we are going to have to hold his testosterone replacement therapy for now.  We will refer him back to his urologist for further testing and management.    Orders:  -     Ambulatory referral/consult to Urology; Future; Expected date: 04/24/2024    8. Hypogonadism in male  Comments:  As above    9. Vitamin D deficiency  Overview:  Patient instructed to continue with OTC Vitamin D3     Assessment & Plan:  I suggested he increase his vitamin-D dose to 5000 units daily.  We will continue to monitor.          [x] Discussed lab findings with the patient.  [x] Discussed diet, exercise and if appropriate, weight loss.  [x] Instructions and information, including risks and benefits of prescribed medication(s) have been reviewed with the patient and patient verbalizes understanding. Questions have been answered to the patient's satisfaction.  [] Appropriate counseling has been given regarding anxiety and depressive issues that were discussed today.  [] Any lab drawn today will be reviewed by physician at the time it is received and appropriate recommendations bill be made and discussed with patient.     No follow-ups on file.   In addition to their scheduled follow up, the patient has also been instructed to follow up on as needed basis.     Future Appointments   Date Time Provider Department Center   7/11/2024  8:10 AM LAB, [facility] [place]   7/25/2024  3:15 PM Kenny Leavitt Sr., MD [facility] [place]         Kenny Leavitt Sr, MD

## 2024-04-10 NOTE — LETTER
AUTHORIZATION FOR RELEASE OF   CONFIDENTIAL INFORMATION    Dear Dr Rader,    We are seeing Sundar Montanez, date of birth 1950, in the clinic at [facility]. Kenny Leavitt Sr., MD is the patient's PCP. Sundar Montanez has an outstanding lab/procedure at the time we reviewed his chart. In order to help keep his health information updated, he has authorized us to request the following medical record(s):        (  )  MAMMOGRAM                                      (  )  COLONOSCOPY      (  )  PAP SMEAR                                          (  )  OUTSIDE LAB RESULTS     (  )  DEXA SCAN                                          (  )  EYE EXAM            (  )  FOOT EXAM                                          (  )  ENTIRE RECORD     (  )  OUTSIDE IMMUNIZATIONS                 (  X)  _Last progress note______________         Please fax records to [facility], Bourgeois, Leonard Jb. Sr., MD, [phone]     If you have any questions, please contact L[facility]  at [phone]      Patient Name: Sundar Montanez  : 1950  Patient Phone #: [phone]

## 2024-04-12 ENCOUNTER — TELEPHONE (OUTPATIENT)
Dept: FAMILY MEDICINE | Facility: CLINIC | Age: 74
End: 2024-04-12
Payer: MEDICARE

## 2024-04-12 NOTE — TELEPHONE ENCOUNTER
Patient notified of his lab results and that he is considered an new patient with Dr Jay it has been since 2021 his last visit.Faxed referral and lab to Dr Jay office with urgent on the referral.Patient verbalized an understanding.

## 2024-04-18 ENCOUNTER — TELEPHONE (OUTPATIENT)
Dept: FAMILY MEDICINE | Facility: CLINIC | Age: 74
End: 2024-04-18
Payer: MEDICARE

## 2024-04-18 NOTE — TELEPHONE ENCOUNTER
----- Message from Zully Pantoja sent at 4/15/2024 10:11 AM CDT -----  HE HAS AN APPOINTMENT WITH DR. HERRERA ON MAY 2

## 2024-05-08 ENCOUNTER — TELEPHONE (OUTPATIENT)
Dept: FAMILY MEDICINE | Facility: CLINIC | Age: 74
End: 2024-05-08
Payer: MEDICARE

## 2024-07-01 DIAGNOSIS — G47.00 INSOMNIA, UNSPECIFIED TYPE: ICD-10-CM

## 2024-07-01 RX ORDER — ESZOPICLONE 3 MG/1
3 TABLET, FILM COATED ORAL NIGHTLY
Qty: 30 TABLET | Refills: 2 | Status: SHIPPED | OUTPATIENT
Start: 2024-07-01

## 2024-07-01 RX ORDER — ESZOPICLONE 3 MG/1
3 TABLET, FILM COATED ORAL NIGHTLY
Qty: 30 TABLET | Refills: 2 | Status: SHIPPED | OUTPATIENT
Start: 2024-07-01 | End: 2024-07-01 | Stop reason: SDUPTHER

## 2024-07-14 PROBLEM — R74.8 ELEVATED ALKALINE PHOSPHATASE LEVEL: Status: ACTIVE | Noted: 2024-07-14

## 2024-07-15 ENCOUNTER — OFFICE VISIT (OUTPATIENT)
Dept: FAMILY MEDICINE | Facility: CLINIC | Age: 74
End: 2024-07-15
Payer: MEDICARE

## 2024-07-15 VITALS
WEIGHT: 194.63 LBS | HEIGHT: 68 IN | SYSTOLIC BLOOD PRESSURE: 126 MMHG | BODY MASS INDEX: 29.5 KG/M2 | RESPIRATION RATE: 20 BRPM | DIASTOLIC BLOOD PRESSURE: 80 MMHG | HEART RATE: 74 BPM | OXYGEN SATURATION: 97 %

## 2024-07-15 DIAGNOSIS — R73.03 PREDIABETES: Primary | ICD-10-CM

## 2024-07-15 DIAGNOSIS — R74.8 ELEVATED ALKALINE PHOSPHATASE LEVEL: ICD-10-CM

## 2024-07-15 DIAGNOSIS — G47.00 INSOMNIA, UNSPECIFIED TYPE: ICD-10-CM

## 2024-07-15 DIAGNOSIS — T15.92XA FOREIGN BODY OF LEFT EYE, INITIAL ENCOUNTER: ICD-10-CM

## 2024-07-15 PROCEDURE — 3079F DIAST BP 80-89 MM HG: CPT | Mod: CPTII,,, | Performed by: FAMILY MEDICINE

## 2024-07-15 PROCEDURE — 1160F RVW MEDS BY RX/DR IN RCRD: CPT | Mod: CPTII,,, | Performed by: FAMILY MEDICINE

## 2024-07-15 PROCEDURE — 1159F MED LIST DOCD IN RCRD: CPT | Mod: CPTII,,, | Performed by: FAMILY MEDICINE

## 2024-07-15 PROCEDURE — 3288F FALL RISK ASSESSMENT DOCD: CPT | Mod: CPTII,,, | Performed by: FAMILY MEDICINE

## 2024-07-15 PROCEDURE — 99214 OFFICE O/P EST MOD 30 MIN: CPT | Mod: ,,, | Performed by: FAMILY MEDICINE

## 2024-07-15 PROCEDURE — 3074F SYST BP LT 130 MM HG: CPT | Mod: CPTII,,, | Performed by: FAMILY MEDICINE

## 2024-07-15 PROCEDURE — 1101F PT FALLS ASSESS-DOCD LE1/YR: CPT | Mod: CPTII,,, | Performed by: FAMILY MEDICINE

## 2024-07-15 RX ORDER — MINERAL OIL, PETROLATUM 425; 573 MG/G; MG/G
OINTMENT OPHTHALMIC NIGHTLY
Qty: 3.5 G | Refills: 0 | Status: SHIPPED | OUTPATIENT
Start: 2024-07-15

## 2024-07-15 RX ORDER — ERYTHROMYCIN 5 MG/G
OINTMENT OPHTHALMIC 3 TIMES DAILY
Qty: 3.5 G | Refills: 0 | Status: SHIPPED | OUTPATIENT
Start: 2024-07-15 | End: 2024-07-22

## 2024-07-15 RX ORDER — CARBOXYMETHYLCELLULOSE SODIUM AND GLYCERIN 5; 9 MG/ML; MG/ML
SOLUTION/ DROPS OPHTHALMIC
Qty: 10 ML | Refills: 0 | Status: SHIPPED | OUTPATIENT
Start: 2024-07-15

## 2024-07-15 NOTE — ASSESSMENT & PLAN NOTE
Lab Results   Component Value Date    HGBA1C 6.1 (H) 07/01/2024    HGBA1C 6.0 01/04/2023     A1c improved from 6.2, to 6.1.  Continue diet-controlled

## 2024-07-15 NOTE — ASSESSMENT & PLAN NOTE
We do not have fluorescein or tetracaine here  Advised patient to go to the urgent care for fluorescein examination for corneal abrasion  Examination here today included inspection under the eyelid in which no foreign bodies were found  Left eye was irrigated thoroughly  We will send in eyedrops and topical eye anti bacterial  He will also go to urgent care after he leaves here

## 2024-07-15 NOTE — ASSESSMENT & PLAN NOTE
Patient's insomnia is well controlled with the above regimen.  Continue current meds at same dose  Discussed side effects of long-term Lunesta

## 2024-07-15 NOTE — PROGRESS NOTES
Family Medicine    Patient ID: [id number]     Chief Complaint: Results ((Patient here to discuss lab results.)) and Foreign Body in Eye (Patient felt something go in his left eye on Saturday evening around 3:00 pn  . Was working in tractor shed.)      HPI:     Sundar Montanez is a 74 y.o. male here today for a follow up.     Past Medical History:   Diagnosis Date    Albuminuria     Anxiety 2023    BPH (benign prostatic hyperplasia) 2023    CAD S/P percutaneous coronary angioplasty 2023    Chronic low back pain     GERD (gastroesophageal reflux disease)     HTN (hypertension) 2006    Hyperlipidemia 2006    Hypertension     Hypogonadism in male 2023    Hypothyroid 2023    Insomnia     Lung nodule 2023    Prediabetes 2023    Radiculopathy         Past Surgical History:   Procedure Laterality Date    ANGIOGRAM  EXTREMITY BILATERAL      back stimulator      CHOLECYSTECTOMY N/A     COLONOSCOPY  2020    Dr Camara  Repeat 4 years    CORONARY ANGIOPLASTY WITH STENT PLACEMENT      INGUINAL HERNIA REPAIR      Reverse Vasectomy      SPINAL FUSION      TRANSURETHRAL RESECTION OF PROSTATE N/A     VASECTOMY          Social History     Tobacco Use    Smoking status: Former     Current packs/day: 0.00     Types: Cigarettes     Quit date: 1972     Years since quittin.2    Smokeless tobacco: Never   Substance and Sexual Activity    Alcohol use: No    Drug use: No    Sexual activity: Not Currently     Partners: Female        Current Outpatient Medications   Medication Instructions    amitriptyline (ELAVIL) 50 mg, Oral, Nightly    atorvastatin (LIPITOR) 40 mg, Oral, Daily    carboxymethylcellulose-glycern (REFRESH OPTIVE) 0.5-0.9 % Drop Instill 1-2 drops in affected eye(s) as needed.    celecoxib (CELEBREX) 100 mg, Oral, Every 3 days, celecoxib 100 mg capsule 1 CAPSULE BY MOUTH ONCE DAILY AFTER MEALS    cholecalciferol (vitamin D3) (VITAMIN D3) 5,000 Units, Oral,  Daily    diltiaZEM (TIAZAC) 360 mg, Oral, Daily    erythromycin (ROMYCIN) ophthalmic ointment Right Eye, 3 times daily    eszopiclone (LUNESTA) 3 mg, Oral, Nightly    finasteride (PROSCAR) 5 mg, Oral, Daily    hydrOXYzine pamoate (VISTARIL) 25 mg, Oral, Nightly PRN    lisinopriL 10 MG tablet 1 tablet, Oral, Daily    naloxone (NARCAN) 4 mg/actuation Spry Nasal, As needed (PRN)    omeprazole (PRILOSEC) 40 mg, Oral, Every morning    oxyCODONE (ROXICODONE) 15 mg, Oral, Every 6 hours PRN    testosterone cypionate (DEPO-TESTOSTERONE) 200 mg, Intramuscular, Every 28 days    white petrolatum-mineral oil 57.3-42.5% (REFRESH P.M.) 57.3-42.5 % Oint Right Eye, Nightly       Review of patient's allergies indicates:   Allergen Reactions    Valium [diazepam]         Patient Care Team:  Kenny Leavitt Sr., MD as PCP - General (Family Medicine)  Gamaliel Champion MD (Anesthesiology)  Thong Jay MD as Consulting Physician (Urology)  Lj Rader MD as Consulting Physician (Cardiology)     Subjective:     Review of Systems   Constitutional:  Negative for activity change, appetite change, fever and unexpected weight change.   HENT:  Negative for congestion, dental problem, rhinorrhea and trouble swallowing.    Eyes:  Negative for visual disturbance.   Respiratory:  Negative for chest tightness and shortness of breath.    Cardiovascular:  Negative for chest pain, palpitations and leg swelling.   Gastrointestinal:  Negative for abdominal pain, blood in stool, constipation, diarrhea, nausea and vomiting.   Genitourinary:  Negative for difficulty urinating.   Musculoskeletal:  Negative for gait problem.   Skin:  Negative for rash and wound.   Neurological:  Negative for dizziness, syncope, speech difficulty, weakness and light-headedness.   Psychiatric/Behavioral:  Negative for confusion and suicidal ideas.        12 point review of systems conducted, negative except as stated in the history of present illness.  "See HPI for details.    Objective:     Visit Vitals  /80   Pulse 74   Resp 20   Ht 5' 8" (1.727 m)   Wt 88.3 kg (194 lb 9.6 oz)   SpO2 97%   BMI 29.59 kg/m²       Physical Exam  Vitals and nursing note reviewed.   Constitutional:       General: He is not in acute distress.     Appearance: Normal appearance. He is not ill-appearing, toxic-appearing or diaphoretic.   HENT:      Head: Normocephalic and atraumatic.      Right Ear: Tympanic membrane, ear canal and external ear normal. There is no impacted cerumen.      Left Ear: Tympanic membrane, ear canal and external ear normal. There is no impacted cerumen.      Ears:      Comments: Left eye exam:    No afferent pupillary defect, no ciliary flush, no corneal opacity lesion, no exophthalmos, no ophthalmoplegia, no pain with eye movements, no reduced ocular light reflex.  No foreign body found under eyelids.  No conjunctival erythema.  Normal eye exam       Nose: No congestion or rhinorrhea.      Mouth/Throat:      Pharynx: Oropharynx is clear. No oropharyngeal exudate or posterior oropharyngeal erythema.   Eyes:      General:         Right eye: No discharge.         Left eye: No discharge.      Extraocular Movements: Extraocular movements intact.      Conjunctiva/sclera: Conjunctivae normal.      Comments: Feeling a foreign body and eye     Cardiovascular:      Rate and Rhythm: Normal rate and regular rhythm.      Heart sounds: No murmur heard.     No friction rub. No gallop.   Pulmonary:      Effort: Pulmonary effort is normal. No respiratory distress.      Breath sounds: Normal breath sounds.   Musculoskeletal:      Right lower leg: No edema.      Left lower leg: No edema.   Skin:     Capillary Refill: Capillary refill takes less than 2 seconds.   Neurological:      Mental Status: He is alert and oriented to person, place, and time.   Psychiatric:         Mood and Affect: Mood normal.         Behavior: Behavior normal.         Thought Content: Thought content " "normal.         Labs Reviewed:     Chemistry:  Lab Results   Component Value Date     03/26/2024    K 4.7 03/26/2024    BUN 8 03/26/2024    CREATININE 1.16 03/26/2024    EGFRNORACEVR 65 03/26/2024    CALCIUM 9.7 03/26/2024    ALBUMIN 4.5 07/01/2024    BILIDIR <0.20 07/01/2024    AST 21 07/01/2024    ALT 19 07/01/2024    KATZDXOK03OG 20.6 (L) 03/26/2024        Lab Results   Component Value Date    HGBA1C 6.1 (H) 07/01/2024        Hematology:  Lab Results   Component Value Date    WBC 9.7 03/26/2024    HGB 15.6 03/26/2024    HCT 48.5 03/26/2024     03/26/2024       Lipid Panel:  Lab Results   Component Value Date    CHOL 180 03/26/2024    HDL 45 03/26/2024    TRIG 103 03/26/2024        Urine:  No results found for: "COLORUA", "APPEARANCEUA", "SGUA", "PHUA", "PROTEINUA", "GLUCOSEUA", "KETONESUA", "BLOODUA", "NITRITESUA", "LEUKOCYTESUR", "RBCUA", "WBCUA", "BACTERIA", "SQEPUA", "HYALINECASTS", "CREATRANDUR", "PROTEINURINE", "UPROTCREA"     Assessment:       ICD-10-CM ICD-9-CM   1. Prediabetes  R73.03 790.29   2. Elevated alkaline phosphatase level  R74.8 790.5   3. Insomnia, unspecified type  G47.00 780.52   4. Foreign body of left eye, initial encounter  T15.92XA 930.9     E915        Plan:     1. Prediabetes  Overview:  Patient is diet controlled .  Follow ADA Diet. Avoid soda, simple sweets, and limit rice/pasta/breads/starches (no more than 45-50 grams per meal).  Maintain healthy weight with goal BMI <30.  Exercise 5 times per week for 30 minutes per day.  Stressed importance of daily foot exams especially if neuropathy is present.  Patient was instructed to notify physician if they notice any sores or skin lesions on the feet.  Stressed the importance of wearing proper fitting comfortable shoes i.e. diabetic footwear.  They were instructed to always wear shoes and never go barefooted.  Stressed importance of annual dilated eye exam.    Assessment & Plan:  Lab Results   Component Value Date    HGBA1C " 6.1 (H) 07/01/2024    HGBA1C 6.0 01/04/2023     A1c improved from 6.2, to 6.1.  Continue diet-controlled      2. Elevated alkaline phosphatase level  Overview:  Appears to be chronic  Improved from 01/30 6, 02/01/2025 today  Isoenzymes all within normal limits  Bilirubin and transaminases normal    Assessment & Plan:  We will monitor      3. Insomnia, unspecified type  Overview:  Continue with Lunesta 3 mg nightly.  Discussed nonpharmcologic sleep inducement techniques such as light reading & self hypnosis tapes & meditation.    Assessment & Plan:  Patient's insomnia is well controlled with the above regimen.  Continue current meds at same dose  Discussed side effects of long-term Lunesta      4. Foreign body of left eye, initial encounter  Overview:  Working under a vehicle this weekend, felt something go in his eye  Has been irritated since  Feels pain when he blinks his eyes  Feels like there is an object under his left upper eyelid  No vision changes no swelling or redness around the eye, no ophthalmoplegia  Can not see an eye doctor soon    Assessment & Plan:  We do not have fluorescein or tetracaine here  Advised patient to go to the urgent care for fluorescein examination for corneal abrasion  Examination here today included inspection under the eyelid in which no foreign bodies were found  Left eye was irrigated thoroughly  We will send in eyedrops and topical eye anti bacterial  He will also go to urgent care after he leaves here    Orders:  -     white petrolatum-mineral oil 57.3-42.5% (REFRESH P.M.) 57.3-42.5 % Oint; Place into the right eye every evening.  Dispense: 3.5 g; Refill: 0  -     carboxymethylcellulose-glycern (REFRESH OPTIVE) 0.5-0.9 % Drop; Instill 1-2 drops in affected eye(s) as needed.  Dispense: 10 mL; Refill: 0  -     erythromycin (ROMYCIN) ophthalmic ointment; Place into the right eye 3 (three) times daily. for 7 days  Dispense: 3.5 g; Refill: 0         Follow up in about 3 months  (around 10/15/2024) for With Labs Prior to Visit, HLD, DM, CMP, CBC. In addition to their scheduled follow up, the patient has also been instructed to follow up on as needed basis.     Future Appointments   Date Time Provider Department Center   10/15/2024  8:20 AM LAB, [facility] [place]   10/17/2024  9:00 AM PROVIDER, [facility] Shukla MD

## 2024-07-17 DIAGNOSIS — G47.00 INSOMNIA, UNSPECIFIED TYPE: ICD-10-CM

## 2024-07-17 RX ORDER — HYDROXYZINE HYDROCHLORIDE 25 MG/1
25 TABLET, FILM COATED ORAL NIGHTLY PRN
Qty: 30 TABLET | Refills: 1 | Status: SHIPPED | OUTPATIENT
Start: 2024-07-17

## 2024-08-27 ENCOUNTER — OFFICE VISIT (OUTPATIENT)
Dept: FAMILY MEDICINE | Facility: CLINIC | Age: 74
End: 2024-08-27
Payer: MEDICARE

## 2024-08-27 VITALS
HEART RATE: 68 BPM | DIASTOLIC BLOOD PRESSURE: 79 MMHG | WEIGHT: 193.81 LBS | HEIGHT: 68 IN | OXYGEN SATURATION: 100 % | TEMPERATURE: 98 F | RESPIRATION RATE: 18 BRPM | SYSTOLIC BLOOD PRESSURE: 139 MMHG | BODY MASS INDEX: 29.37 KG/M2

## 2024-08-27 DIAGNOSIS — L03.317 CELLULITIS OF BUTTOCK: Primary | ICD-10-CM

## 2024-08-27 PROCEDURE — 1101F PT FALLS ASSESS-DOCD LE1/YR: CPT | Mod: CPTII,,, | Performed by: NURSE PRACTITIONER

## 2024-08-27 PROCEDURE — 1160F RVW MEDS BY RX/DR IN RCRD: CPT | Mod: CPTII,,, | Performed by: NURSE PRACTITIONER

## 2024-08-27 PROCEDURE — 1125F AMNT PAIN NOTED PAIN PRSNT: CPT | Mod: CPTII,,, | Performed by: NURSE PRACTITIONER

## 2024-08-27 PROCEDURE — 3078F DIAST BP <80 MM HG: CPT | Mod: CPTII,,, | Performed by: NURSE PRACTITIONER

## 2024-08-27 PROCEDURE — 3288F FALL RISK ASSESSMENT DOCD: CPT | Mod: CPTII,,, | Performed by: NURSE PRACTITIONER

## 2024-08-27 PROCEDURE — 3075F SYST BP GE 130 - 139MM HG: CPT | Mod: CPTII,,, | Performed by: NURSE PRACTITIONER

## 2024-08-27 PROCEDURE — 99214 OFFICE O/P EST MOD 30 MIN: CPT | Mod: ,,, | Performed by: NURSE PRACTITIONER

## 2024-08-27 PROCEDURE — 1159F MED LIST DOCD IN RCRD: CPT | Mod: CPTII,,, | Performed by: NURSE PRACTITIONER

## 2024-08-27 RX ORDER — METRONIDAZOLE 500 MG/1
500 TABLET ORAL EVERY 12 HOURS
Qty: 20 TABLET | Refills: 0 | Status: SHIPPED | OUTPATIENT
Start: 2024-08-27

## 2024-08-27 RX ORDER — CIPROFLOXACIN 500 MG/1
500 TABLET ORAL 2 TIMES DAILY
Qty: 20 TABLET | Refills: 0 | Status: SHIPPED | OUTPATIENT
Start: 2024-08-27

## 2024-08-27 RX ORDER — ASPIRIN 81 MG/1
81 TABLET ORAL ONCE
COMMUNITY

## 2024-08-27 RX ORDER — MULTIVITAMIN
1 TABLET ORAL EVERY MORNING
COMMUNITY

## 2024-08-27 RX ORDER — CLOTRIMAZOLE 1 %
CREAM (GRAM) TOPICAL 2 TIMES DAILY
Qty: 45 G | Refills: 0 | Status: SHIPPED | OUTPATIENT
Start: 2024-08-27

## 2024-08-27 NOTE — ASSESSMENT & PLAN NOTE
Start Cipro + Flagyl BID x 10 days + Clotrimazole cream BID  Concern for extension as this is close proximity to perianal region  Avoid any pressure to area + discontinue cortisone cream  Follow up in 1 week to reevaluate and sooner if needed  ED precautions

## 2024-08-27 NOTE — PROGRESS NOTES
"  [facility]  Ruben Cope, LYDIA-C    Patient ID: [id number]     Chief Complaint: Rectal Pain (C/o pain at end of tail bone, beginning to the top of buttocks for a month)      HPI:     Sundar Montanez is a 74 y.o. male here today for tailbone pain, 8/10 pain, started approx 1mos ago; reports applying cortisone cream to affected area for 1mos. Denies fever, body aches, chills. Reports sitting on "doughnut" cushion since pain started. Reports in excess of 8 hrs sitting/day. Reports h/o 4 back surgeries (Dr. Nicole/[facility]) and lack of physical activity. Reports recent Prostate Ca dx approx 1mos (Dr. Jay).    Past Medical History:   Diagnosis Date    Albuminuria     Anxiety 2023    BPH (benign prostatic hyperplasia) 2023    CAD S/P percutaneous coronary angioplasty 2023    Cancer     Chronic low back pain     GERD (gastroesophageal reflux disease)     HTN (hypertension) 2006    Hyperlipidemia 2006    Hypertension     Hypogonadism in male 2023    Hypothyroid 2023    Insomnia     Lung nodule 2023    Prediabetes 2023    Prostate cancer 2024    Radiculopathy         Past Surgical History:   Procedure Laterality Date    ANGIOGRAM  EXTREMITY BILATERAL      back stimulator      CHOLECYSTECTOMY N/A     COLONOSCOPY  2020    Dr Camara  Repeat 4 years    CORONARY ANGIOPLASTY WITH STENT PLACEMENT      INGUINAL HERNIA REPAIR      Reverse Vasectomy      SPINAL FUSION      TRANSURETHRAL RESECTION OF PROSTATE N/A     VASECTOMY          Social History     Tobacco Use    Smoking status: Former     Current packs/day: 0.00     Types: Cigarettes     Quit date: 1972     Years since quittin.3    Smokeless tobacco: Never   Substance and Sexual Activity    Alcohol use: No    Drug use: Yes     Types: Oxycodone    Sexual activity: Not Currently     Partners: Female        Current Outpatient Medications   Medication Instructions    amitriptyline (ELAVIL) 50 mg, " "Oral, Nightly    aspirin (ECOTRIN) 81 mg, Oral, Once    atorvastatin (LIPITOR) 40 mg, Oral, Daily    carboxymethylcellulose-glycern (REFRESH OPTIVE) 0.5-0.9 % Drop Instill 1-2 drops in affected eye(s) as needed.    celecoxib (CELEBREX) 100 mg, Oral, Every 3 days, celecoxib 100 mg capsule 1 CAPSULE BY MOUTH ONCE DAILY AFTER MEALS    cholecalciferol (vitamin D3) (VITAMIN D3) 5,000 Units, Oral, Daily    ciprofloxacin HCl (CIPRO) 500 mg, Oral, 2 times daily    clotrimazole (LOTRIMIN) 1 % cream Topical (Top), 2 times daily    diltiaZEM (TIAZAC) 360 mg, Oral, Daily    eszopiclone (LUNESTA) 3 mg, Oral, Nightly    finasteride (PROSCAR) 5 mg, Daily    hydrOXYzine HCL (ATARAX) 25 mg, Oral, Nightly PRN    lisinopriL 10 MG tablet 1 tablet, Oral, Daily    metroNIDAZOLE (FLAGYL) 500 mg, Oral, Every 12 hours    multivitamin with folic acid 400 mcg Tab 1 tablet, Oral, Every morning    naloxone (NARCAN) 4 mg/actuation Spry Nasal, As needed (PRN)    omeprazole (PRILOSEC) 40 mg, Oral, Every morning    oxyCODONE (ROXICODONE) 15 mg, Oral, Every 6 hours PRN    testosterone cypionate (DEPO-TESTOSTERONE) 200 mg, Intramuscular, Every 28 days    white petrolatum-mineral oil 57.3-42.5% (REFRESH P.M.) 57.3-42.5 % Oint Right Eye, Nightly       Review of patient's allergies indicates:   Allergen Reactions    Valium [diazepam]         Patient Care Team:  Kenny Leavitt Sr., MD as PCP - General (Family Medicine)  Gamaliel Champion MD (Anesthesiology)  Thong aJy MD as Consulting Physician (Urology)  Lj Rader MD as Consulting Physician (Cardiology)     Subjective:     Review of Systems    12 point review of systems conducted, negative except as stated in the history of present illness. See HPI for details.    Objective:     Visit Vitals  /79 (BP Location: Right arm, Patient Position: Sitting)   Pulse 68   Temp 98 °F (36.7 °C)   Resp 18   Ht 5' 8" (1.727 m)   Wt 87.9 kg (193 lb 12.8 oz)   SpO2 100%   BMI 29.47 " kg/m²       Physical Exam  Vitals and nursing note reviewed.   Constitutional:       Appearance: Normal appearance.   HENT:      Head: Normocephalic and atraumatic.   Cardiovascular:      Rate and Rhythm: Normal rate and regular rhythm.   Pulmonary:      Effort: Pulmonary effort is normal.      Breath sounds: Normal breath sounds.   Abdominal:      Palpations: Abdomen is soft.      Tenderness: There is no abdominal tenderness.   Musculoskeletal:         General: No swelling.      Cervical back: Normal range of motion and neck supple.      Right lower leg: No edema.      Left lower leg: No edema.      Comments: Patient has markedly limited range of motion of his back secondary to stiffness and pain.   Skin:     General: Skin is warm and dry.          Neurological:      General: No focal deficit present.      Mental Status: He is alert and oriented to person, place, and time.      Comments: Patient is able to ambulate unaided.   Psychiatric:         Mood and Affect: Mood normal.         Labs Reviewed:     Chemistry:  Lab Results   Component Value Date     03/26/2024    K 4.7 03/26/2024    BUN 8 03/26/2024    CREATININE 1.16 03/26/2024    EGFRNORACEVR 65 03/26/2024    CALCIUM 9.7 03/26/2024    ALBUMIN 4.5 07/01/2024    BILIDIR <0.20 07/01/2024    AST 21 07/01/2024    ALT 19 07/01/2024    [id number] 20.6 (L) 03/26/2024        Lab Results   Component Value Date    HGBA1C 6.1 (H) 07/01/2024        Hematology:  Lab Results   Component Value Date    WBC 9.7 03/26/2024    HGB 15.6 03/26/2024    HCT 48.5 03/26/2024     03/26/2024       Lipid Panel:  Lab Results   Component Value Date    CHOL 180 03/26/2024    HDL 45 03/26/2024    TRIG 103 03/26/2024      Assessment:       ICD-10-CM ICD-9-CM   1. Cellulitis of buttock  L03.317 682.5        Plan:     1. Cellulitis of buttock  Assessment & Plan:  Start Cipro + Flagyl BID x 10 days + Clotrimazole cream BID  Concern for extension as this is close proximity to  perianal region  Avoid any pressure to area + discontinue cortisone cream  Follow up in 1 week to reevaluate and sooner if needed  ED precautions        Other orders  -     metroNIDAZOLE (FLAGYL) 500 MG tablet; Take 1 tablet (500 mg total) by mouth every 12 (twelve) hours.  Dispense: 20 tablet; Refill: 0  -     ciprofloxacin HCl (CIPRO) 500 MG tablet; Take 1 tablet (500 mg total) by mouth 2 (two) times daily.  Dispense: 20 tablet; Refill: 0  -     clotrimazole (LOTRIMIN) 1 % cream; Apply topically 2 (two) times daily.  Dispense: 45 g; Refill: 0         Follow up in about 1 week (around 9/3/2024) for Routine Follow Up. In addition to their scheduled follow up, the patient has also been instructed to follow up on as needed basis.     Future Appointments   Date Time Provider Department Center   9/4/2024  1:00 PM PROVIDER, [facility] [place]   10/15/2024  8[facility]20 [facility] [place]   10/23/2024  8:40 AM PROVIDER, [facility] [place]

## 2024-08-29 NOTE — PROGRESS NOTES
"  Family Medicine    Patient ID: [id number]     Chief Complaint: Follow-up (Patient here for 1 week follow up for Cellulitis of buttock. "Doing Much better" states patient.)      HPI:     Sundar Montanez is a 74 y.o. male here today for a follow up for his skin lesion at the gluteal cleft.  Reports his wife takes picture of it every night and she tells him that it is looking much better.  He does reported significant decrease in pain.  He has been compliant with both medications and the antifungal cream    Past Medical History:   Diagnosis Date    Albuminuria     Anxiety 2023    BPH (benign prostatic hyperplasia) 2023    CAD S/P percutaneous coronary angioplasty 2023    Cancer     Chronic low back pain     GERD (gastroesophageal reflux disease)     HTN (hypertension) 2006    Hyperlipidemia 2006    Hypertension     Hypogonadism in male 2023    Hypothyroid 2023    Insomnia     Lung nodule 2023    Prediabetes 2023    Prostate cancer 2024    Radiculopathy         Past Surgical History:   Procedure Laterality Date    ANGIOGRAM  EXTREMITY BILATERAL      back stimulator      CHOLECYSTECTOMY N/A     COLONOSCOPY  2020    Dr Camara  Repeat 4 years    CORONARY ANGIOPLASTY WITH STENT PLACEMENT      INGUINAL HERNIA REPAIR      Reverse Vasectomy      SPINAL FUSION      TRANSURETHRAL RESECTION OF PROSTATE N/A     VASECTOMY          Social History     Tobacco Use    Smoking status: Former     Current packs/day: 0.00     Types: Cigarettes     Quit date: 1972     Years since quittin.3    Smokeless tobacco: Never   Substance and Sexual Activity    Alcohol use: No    Drug use: Yes     Types: Oxycodone    Sexual activity: Not Currently     Partners: Female        Current Outpatient Medications   Medication Instructions    amitriptyline (ELAVIL) 50 mg, Oral, Nightly    aspirin (ECOTRIN) 81 mg, Oral, Once    atorvastatin (LIPITOR) 40 mg, Oral, Daily    " carboxymethylcellulose-glycern (REFRESH OPTIVE) 0.5-0.9 % Drop Instill 1-2 drops in affected eye(s) as needed.    celecoxib (CELEBREX) 100 mg, Oral, Every 3 days, celecoxib 100 mg capsule 1 CAPSULE BY MOUTH ONCE DAILY AFTER MEALS    cholecalciferol (vitamin D3) (VITAMIN D3) 5,000 Units, Oral, Daily    ciprofloxacin HCl (CIPRO) 500 mg, Oral, 2 times daily    clotrimazole (LOTRIMIN) 1 % cream Topical (Top), 2 times daily    diltiaZEM (TIAZAC) 360 mg, Oral, Daily    eszopiclone (LUNESTA) 3 mg, Oral, Nightly    finasteride (PROSCAR) 5 mg, Oral, Daily    hydrOXYzine HCL (ATARAX) 25 mg, Oral, Nightly PRN    lisinopriL 10 MG tablet 1 tablet, Oral, Daily    metroNIDAZOLE (FLAGYL) 500 mg, Oral, Every 12 hours    multivitamin with folic acid 400 mcg Tab 1 tablet, Oral, Every morning    naloxone (NARCAN) 4 mg/actuation Spry Nasal, As needed (PRN)    omeprazole (PRILOSEC) 40 mg, Oral, Every morning    oxyCODONE (ROXICODONE) 15 mg, Oral, Every 6 hours PRN    testosterone cypionate (DEPO-TESTOSTERONE) 200 mg, Intramuscular, Every 28 days    white petrolatum-mineral oil 57.3-42.5% (REFRESH P.M.) 57.3-42.5 % Oint Right Eye, Nightly       Review of patient's allergies indicates:   Allergen Reactions    Valium [diazepam]         Patient Care Team:  Kenny Leavitt Sr., MD as PCP - General (Family Medicine)  Gamaliel Champion MD (Anesthesiology)  Thong Jay MD as Consulting Physician (Urology)  Lj Rader MD as Consulting Physician (Cardiology)     Subjective:     Review of Systems   Constitutional:  Negative for activity change, appetite change, fever and unexpected weight change.   HENT:  Negative for congestion, dental problem, rhinorrhea and trouble swallowing.    Eyes:  Negative for visual disturbance.   Respiratory:  Negative for chest tightness and shortness of breath.    Cardiovascular:  Negative for chest pain, palpitations and leg swelling.   Gastrointestinal:  Negative for abdominal pain, blood  "in stool, constipation, diarrhea, nausea and vomiting.   Genitourinary:  Negative for difficulty urinating.   Musculoskeletal:  Negative for gait problem.   Skin:  Negative for rash and wound.   Neurological:  Negative for dizziness, syncope, speech difficulty, weakness and light-headedness.   Psychiatric/Behavioral:  Negative for confusion and suicidal ideas.        12 point review of systems conducted, negative except as stated in the history of present illness. See HPI for details.    Objective:     Visit Vitals  /76   Pulse 78   Temp 97.6 °F (36.4 °C)   Resp 20   Ht 5' 8" (1.727 m)   Wt 88.5 kg (195 lb 3.2 oz)   SpO2 100%   BMI 29.68 kg/m²       Physical Exam  Vitals and nursing note reviewed.   Constitutional:       General: He is not in acute distress.     Appearance: Normal appearance. He is normal weight. He is not ill-appearing, toxic-appearing or diaphoretic.   HENT:      Head: Normocephalic and atraumatic.      Right Ear: Tympanic membrane, ear canal and external ear normal. There is no impacted cerumen.      Left Ear: Tympanic membrane, ear canal and external ear normal. There is no impacted cerumen.      Nose: No congestion or rhinorrhea.      Mouth/Throat:      Pharynx: Oropharynx is clear. No oropharyngeal exudate or posterior oropharyngeal erythema.   Eyes:      General:         Right eye: No discharge.         Left eye: No discharge.      Extraocular Movements: Extraocular movements intact.      Conjunctiva/sclera: Conjunctivae normal.   Cardiovascular:      Rate and Rhythm: Normal rate and regular rhythm.      Heart sounds: No murmur heard.     No friction rub. No gallop.   Pulmonary:      Effort: Pulmonary effort is normal. No respiratory distress.      Breath sounds: Normal breath sounds.   Musculoskeletal:      Cervical back: Normal range of motion. No rigidity or tenderness.      Right lower leg: No edema.      Left lower leg: No edema.   Lymphadenopathy:      Cervical: No cervical " "adenopathy.   Skin:     Capillary Refill: Capillary refill takes less than 2 seconds.      Coloration: Skin is not jaundiced.      Findings: Erythema present. No bruising or rash.      Comments: 2 in below the gluteal cleft on the left side of the buttock there is some erythema and well-healing scab.  No warmth no discharge, no tenderness.  Well-healing lesion of some variety.   Neurological:      Mental Status: He is alert and oriented to person, place, and time.   Psychiatric:         Mood and Affect: Mood normal.         Behavior: Behavior normal.         Thought Content: Thought content normal.         Labs Reviewed:     Chemistry:  Lab Results   Component Value Date     03/26/2024    K 4.7 03/26/2024    BUN 8 03/26/2024    CREATININE 1.16 03/26/2024    EGFRNORACEVR 65 03/26/2024    CALCIUM 9.7 03/26/2024    ALBUMIN 4.5 07/01/2024    BILIDIR <0.20 07/01/2024    AST 21 07/01/2024    ALT 19 07/01/2024    [id number] 20.6 (L) 03/26/2024        Lab Results   Component Value Date    HGBA1C 6.1 (H) 07/01/2024        Hematology:  Lab Results   Component Value Date    WBC 9.7 03/26/2024    HGB 15.6 03/26/2024    HCT 48.5 03/26/2024     03/26/2024       Lipid Panel:  Lab Results   Component Value Date    CHOL 180 03/26/2024    HDL 45 03/26/2024    TRIG 103 03/26/2024        Urine:  No results found for: "COLORUA", "APPEARANCEUA", "SGUA", "PHUA", "PROTEINUA", "GLUCOSEUA", "KETONESUA", "BLOODUA", "NITRITESUA", "LEUKOCYTESUR", "RBCUA", "WBCUA", "BACTERIA", "SQEPUA", "HYALINECASTS", "CREATRANDUR", "PROTEINURINE", "UPROTCREA"     Assessment:       ICD-10-CM ICD-9-CM   1. Cellulitis of buttock  L03.317 682.5        Plan:     1. Cellulitis of buttock  Overview:  Gluteal cleft pressure sore.  Started approx 1 mos ago.  Here for 1 week evaluation post Cipro/Flagyl & anti-fungal cream.      Assessment & Plan:  Continue both antibiotics until completion   Continue antifungal cream until completion of antibiotics "   Follow up as needed, with wife checking intermittently for any worsening signs  Recommend continued decreased sitting and pressure on the area             No follow-ups on file. In addition to their scheduled follow up, the patient has also been instructed to follow up on as needed basis.     Future Appointments   Date Time Provider Department Center   10/15/2024  8:20 AM LAB, [facility] [place]   10/23/2024  8:20 AM PROVIDER, [facility] MD

## 2024-09-04 ENCOUNTER — OFFICE VISIT (OUTPATIENT)
Dept: FAMILY MEDICINE | Facility: CLINIC | Age: 74
End: 2024-09-04
Payer: MEDICARE

## 2024-09-04 VITALS
HEART RATE: 78 BPM | SYSTOLIC BLOOD PRESSURE: 122 MMHG | OXYGEN SATURATION: 100 % | RESPIRATION RATE: 20 BRPM | DIASTOLIC BLOOD PRESSURE: 76 MMHG | WEIGHT: 195.19 LBS | TEMPERATURE: 98 F | BODY MASS INDEX: 29.58 KG/M2 | HEIGHT: 68 IN

## 2024-09-04 DIAGNOSIS — L03.317 CELLULITIS OF BUTTOCK: Primary | ICD-10-CM

## 2024-09-04 NOTE — ASSESSMENT & PLAN NOTE
Continue both antibiotics until completion   Continue antifungal cream until completion of antibiotics   Follow up as needed, with wife checking intermittently for any worsening signs  Recommend continued decreased sitting and pressure on the area

## 2024-09-17 DIAGNOSIS — G47.00 INSOMNIA, UNSPECIFIED TYPE: ICD-10-CM

## 2024-09-17 RX ORDER — HYDROXYZINE HYDROCHLORIDE 25 MG/1
25 TABLET, FILM COATED ORAL NIGHTLY PRN
Qty: 30 TABLET | Refills: 1 | Status: SHIPPED | OUTPATIENT
Start: 2024-09-17

## 2024-09-17 NOTE — TELEPHONE ENCOUNTER
Refill request on Hydroxyzine 25 mg    Last refill 7/17/2024    # 30 X 1    Last office visit 9/4/2024      Next appointment 10/23/2024

## 2024-10-14 ENCOUNTER — TELEPHONE (OUTPATIENT)
Dept: FAMILY MEDICINE | Facility: CLINIC | Age: 74
End: 2024-10-14
Payer: MEDICARE

## 2024-10-14 NOTE — TELEPHONE ENCOUNTER
Addended by: DANIEL SORENSON on: 7/17/2018 09:45 AM     Modules accepted: Orders     Patient instructed to go to Urgent care , If an opening comes up on Wednesday we will, get him in. Other wise go to urgent care. Patient verbalized an understanding.

## 2024-10-14 NOTE — TELEPHONE ENCOUNTER
----- Message from Zully sent at 10/14/2024  3:39 PM CDT -----  He is having problems sitting again

## 2024-10-14 NOTE — TELEPHONE ENCOUNTER
"Patient states "he is having trouble with pain around the tail bone area again. He states that it never really cleared up. "Was seen here on 9/4/2024. Had a round of Cipro, Flagyl and anti fungal cream.    Please advise.  "

## 2024-10-16 ENCOUNTER — OFFICE VISIT (OUTPATIENT)
Dept: FAMILY MEDICINE | Facility: CLINIC | Age: 74
End: 2024-10-16
Payer: MEDICARE

## 2024-10-16 VITALS
RESPIRATION RATE: 20 BRPM | SYSTOLIC BLOOD PRESSURE: 138 MMHG | TEMPERATURE: 98 F | HEIGHT: 68 IN | HEART RATE: 86 BPM | OXYGEN SATURATION: 96 % | WEIGHT: 196.81 LBS | DIASTOLIC BLOOD PRESSURE: 88 MMHG | BODY MASS INDEX: 29.83 KG/M2

## 2024-10-16 DIAGNOSIS — L89.301 PRESSURE INJURY OF BUTTOCK, STAGE 1, UNSPECIFIED LATERALITY: Primary | ICD-10-CM

## 2024-10-16 DIAGNOSIS — G47.00 INSOMNIA, UNSPECIFIED TYPE: ICD-10-CM

## 2024-10-16 PROCEDURE — 1159F MED LIST DOCD IN RCRD: CPT | Mod: CPTII,,, | Performed by: FAMILY MEDICINE

## 2024-10-16 PROCEDURE — 3075F SYST BP GE 130 - 139MM HG: CPT | Mod: CPTII,,, | Performed by: FAMILY MEDICINE

## 2024-10-16 PROCEDURE — 1101F PT FALLS ASSESS-DOCD LE1/YR: CPT | Mod: CPTII,,, | Performed by: FAMILY MEDICINE

## 2024-10-16 PROCEDURE — 1125F AMNT PAIN NOTED PAIN PRSNT: CPT | Mod: CPTII,,, | Performed by: FAMILY MEDICINE

## 2024-10-16 PROCEDURE — 99213 OFFICE O/P EST LOW 20 MIN: CPT | Mod: ,,, | Performed by: FAMILY MEDICINE

## 2024-10-16 PROCEDURE — 3079F DIAST BP 80-89 MM HG: CPT | Mod: CPTII,,, | Performed by: FAMILY MEDICINE

## 2024-10-16 PROCEDURE — 3288F FALL RISK ASSESSMENT DOCD: CPT | Mod: CPTII,,, | Performed by: FAMILY MEDICINE

## 2024-10-16 RX ORDER — ESZOPICLONE 3 MG/1
3 TABLET, FILM COATED ORAL NIGHTLY
Qty: 30 TABLET | Refills: 2 | Status: SHIPPED | OUTPATIENT
Start: 2024-10-16

## 2024-10-16 RX ORDER — MUPIROCIN 20 MG/G
OINTMENT TOPICAL 2 TIMES DAILY
Qty: 22 G | Refills: 2 | Status: SHIPPED | OUTPATIENT
Start: 2024-10-16 | End: 2024-10-21

## 2024-10-16 RX ORDER — METRONIDAZOLE 7.5 MG/G
CREAM TOPICAL 2 TIMES DAILY
COMMUNITY
Start: 2024-10-08

## 2024-10-16 RX ORDER — DOXYCYCLINE HYCLATE 100 MG
100 TABLET ORAL 2 TIMES DAILY
Qty: 10 EACH | Refills: 0 | Status: SHIPPED | OUTPATIENT
Start: 2024-10-16 | End: 2024-10-21

## 2024-10-16 NOTE — PROGRESS NOTES
Family Medicine    Patient ID: [id number]     Chief Complaint: Recurrent Skin Infections (Patient using antifungal cream.) and Medication Refill (Patient request refill on Lunesta 3 mg.)      HPI:     Sundar Montanez is a 74 y.o. male here today for a follow up on the skin lesion top of the gluteal cleft.  He feels it was getting worse.  He has been sitting on a donut.  He has also been using an antifungal cream.  No fevers or discharge.    Past Medical History:   Diagnosis Date    Albuminuria     Anxiety 2023    BPH (benign prostatic hyperplasia) 2023    CAD S/P percutaneous coronary angioplasty 2023    Cancer     Chronic low back pain     GERD (gastroesophageal reflux disease)     HTN (hypertension) 2006    Hyperlipidemia 2006    Hypertension     Hypogonadism in male 2023    Hypothyroid 2023    Insomnia     Lung nodule 2023    Prediabetes 2023    Prostate cancer 2024    Radiculopathy         Past Surgical History:   Procedure Laterality Date    ANGIOGRAM  EXTREMITY BILATERAL      back stimulator      CHOLECYSTECTOMY N/A     COLONOSCOPY  2020    Dr Camara  Repeat 4 years    CORONARY ANGIOPLASTY WITH STENT PLACEMENT      INGUINAL HERNIA REPAIR      Reverse Vasectomy      SPINAL FUSION      TRANSURETHRAL RESECTION OF PROSTATE N/A     VASECTOMY          Social History     Tobacco Use    Smoking status: Former     Current packs/day: 0.00     Types: Cigarettes     Quit date: 1972     Years since quittin.4    Smokeless tobacco: Never   Substance and Sexual Activity    Alcohol use: No    Drug use: Yes     Types: Oxycodone    Sexual activity: Not Currently     Partners: Female        Current Outpatient Medications   Medication Instructions    amitriptyline (ELAVIL) 50 mg, Nightly    aspirin (ECOTRIN) 81 mg, Once    atorvastatin (LIPITOR) 40 mg, Daily    carboxymethylcellulose-glycern (REFRESH OPTIVE) 0.5-0.9 % Drop Instill 1-2 drops in affected  eye(s) as needed.    celecoxib (CELEBREX) 100 mg, Oral, Every 3 days, celecoxib 100 mg capsule 1 CAPSULE BY MOUTH ONCE DAILY AFTER MEALS    cholecalciferol (vitamin D3) (VITAMIN D3) 5,000 Units, Oral, Daily    ciprofloxacin HCl (CIPRO) 500 mg, Oral, 2 times daily    clotrimazole (LOTRIMIN) 1 % cream Topical (Top), 2 times daily    diltiaZEM (TIAZAC) 360 mg, Daily    doxycycline (VIBRA-TABS) 100 mg, Oral, 2 times daily    eszopiclone (LUNESTA) 3 mg, Oral, Nightly    finasteride (PROSCAR) 5 mg, Daily    hydrOXYzine HCL (ATARAX) 25 mg, Oral, Nightly PRN    lisinopriL 10 MG tablet 1 tablet, Daily    metroNIDAZOLE (FLAGYL) 500 mg, Oral, Every 12 hours    metronidazole 0.75% (METROCREAM) 0.75 % Crea 2 times daily    multivitamin with folic acid 400 mcg Tab 1 tablet, Every morning    mupirocin (BACTROBAN) 2 % ointment Topical (Top), 2 times daily    naloxone (NARCAN) 4 mg/actuation Spry As needed (PRN)    omeprazole (PRILOSEC) 40 mg, Every morning    oxyCODONE (ROXICODONE) 15 mg, Every 6 hours PRN    testosterone cypionate (DEPO-TESTOSTERONE) 200 mg, Intramuscular, Every 28 days    white petrolatum-mineral oil 57.3-42.5% (REFRESH P.M.) 57.3-42.5 % Oint Right Eye, Nightly       Review of patient's allergies indicates:   Allergen Reactions    Valium [diazepam]         Patient Care Team:  Kenny Leavitt Sr., MD as PCP - General (Family Medicine)  Gamaliel Champion MD (Anesthesiology)  Thong Jay MD as Consulting Physician (Urology)  Lj Rader MD as Consulting Physician (Cardiology)     Subjective:     Review of Systems   Constitutional:  Negative for activity change, appetite change, fever and unexpected weight change.   HENT:  Negative for congestion, dental problem, rhinorrhea and trouble swallowing.    Eyes:  Negative for visual disturbance.   Respiratory:  Negative for chest tightness and shortness of breath.    Cardiovascular:  Negative for chest pain, palpitations and leg swelling.  "  Gastrointestinal:  Negative for abdominal pain, blood in stool, constipation, diarrhea, nausea and vomiting.   Genitourinary:  Negative for difficulty urinating.   Musculoskeletal:  Negative for gait problem.   Skin:  Positive for wound. Negative for rash.   Neurological:  Negative for dizziness, syncope, speech difficulty, weakness and light-headedness.   Psychiatric/Behavioral:  Negative for confusion and suicidal ideas.        12 point review of systems conducted, negative except as stated in the history of present illness. See HPI for details.    Objective:     Visit Vitals  /88 (Patient Position: Sitting)   Pulse 86   Temp 97.8 °F (36.6 °C)   Resp 20   Ht 5' 8" (1.727 m)   Wt 89.3 kg (196 lb 12.8 oz)   SpO2 96%   BMI 29.92 kg/m²       Physical Exam  Vitals and nursing note reviewed.   Constitutional:       General: He is not in acute distress.     Appearance: Normal appearance. He is not ill-appearing, toxic-appearing or diaphoretic.   HENT:      Head: Normocephalic and atraumatic.      Right Ear: Tympanic membrane, ear canal and external ear normal. There is no impacted cerumen.      Left Ear: Tympanic membrane, ear canal and external ear normal. There is no impacted cerumen.      Nose: No congestion or rhinorrhea.      Mouth/Throat:      Pharynx: Oropharynx is clear. No oropharyngeal exudate or posterior oropharyngeal erythema.   Eyes:      General:         Right eye: No discharge.         Left eye: No discharge.      Extraocular Movements: Extraocular movements intact.      Conjunctiva/sclera: Conjunctivae normal.   Cardiovascular:      Rate and Rhythm: Normal rate and regular rhythm.      Heart sounds: No murmur heard.     No friction rub. No gallop.   Pulmonary:      Effort: Pulmonary effort is normal. No respiratory distress.      Breath sounds: Normal breath sounds.   Musculoskeletal:      Right lower leg: No edema.      Left lower leg: No edema.   Skin:     Capillary Refill: Capillary refill " "takes less than 2 seconds.      Comments: Just below the top of the gluteal cleft on the bilateral very medial buttock there is erythema with central discoloration consistent with developing pressure ulcer.  However there is no current break in the skin in the area does not appear infected.   Neurological:      Mental Status: He is alert and oriented to person, place, and time.   Psychiatric:         Mood and Affect: Mood normal.         Behavior: Behavior normal.         Thought Content: Thought content normal.         Labs Reviewed:     Chemistry:  Lab Results   Component Value Date     03/26/2024    K 4.7 03/26/2024    BUN 8 03/26/2024    CREATININE 1.16 03/26/2024    EGFRNORACEVR 65 03/26/2024    CALCIUM 9.7 03/26/2024    ALBUMIN 4.5 07/01/2024    BILIDIR <0.20 07/01/2024    AST 21 07/01/2024    ALT 19 07/01/2024    UDCRTLPJ49VA 20.6 (L) 03/26/2024        Lab Results   Component Value Date    HGBA1C 6.1 (H) 07/01/2024        Hematology:  Lab Results   Component Value Date    WBC 9.7 03/26/2024    HGB 15.6 03/26/2024    HCT 48.5 03/26/2024     03/26/2024       Lipid Panel:  Lab Results   Component Value Date    CHOL 180 03/26/2024    HDL 45 03/26/2024    TRIG 103 03/26/2024        Urine:  No results found for: "COLORUA", "APPEARANCEUA", "SGUA", "PHUA", "PROTEINUA", "GLUCOSEUA", "KETONESUA", "BLOODUA", "NITRITESUA", "LEUKOCYTESUR", "RBCUA", "WBCUA", "BACTERIA", "SQEPUA", "HYALINECASTS", "CREATRANDUR", "PROTEINURINE", "UPROTCREA"     Assessment:       ICD-10-CM ICD-9-CM   1. Pressure injury of buttock, stage 1, unspecified laterality  L89.301 707.05     707.21   2. Insomnia, unspecified type  G47.00 780.52        Plan:     1. Pressure injury of buttock, stage 1, unspecified laterality  -     mupirocin (BACTROBAN) 2 % ointment; Apply topically 2 (two) times daily. for 5 days  Dispense: 22 g; Refill: 2  -     doxycycline (VIBRA-TABS) 100 MG tablet; Take 1 tablet (100 mg total) by mouth 2 (two) times " daily. for 5 days  Dispense: 10 each; Refill: 0  -     Ambulatory referral/consult to Wound Clinic; Future; Expected date: 10/23/2024    2. Insomnia, unspecified type  Overview:  Continue with Lunesta 3 mg nightly.  Discussed nonpharmcologic sleep inducement techniques such as light reading & self hypnosis tapes & meditation.    Orders:  -     eszopiclone (LUNESTA) 3 mg Tab; Take 1 tablet (3 mg total) by mouth nightly.  Dispense: 30 tablet; Refill: 2       Appears on exam to be a stage I pressure ulcer as opposed to an infectious process.  He will stop using antifungal.  We will refer him to wound care in [place] which he will begin at the soonest next week because he has gone on vacation.  In the meantime I am going to give him a round of doxycycline which she will hold unless symptoms worsen.  Also recommended Bactroban twice daily not necessarily for infection purposes but to serve as needed a protective emollient.    No follow-ups on file. In addition to their scheduled follow up, the patient has also been instructed to follow up on as needed basis.     Future Appointments   Date Time Provider Department Center   10/23/2024  8:20 AM PROVIDER, [facility] UZIEL Shukla MD

## 2024-11-12 ENCOUNTER — OFFICE VISIT (OUTPATIENT)
Dept: FAMILY MEDICINE | Facility: CLINIC | Age: 74
End: 2024-11-12
Payer: MEDICARE

## 2024-11-12 VITALS
RESPIRATION RATE: 18 BRPM | HEART RATE: 67 BPM | TEMPERATURE: 99 F | DIASTOLIC BLOOD PRESSURE: 70 MMHG | BODY MASS INDEX: 30.34 KG/M2 | WEIGHT: 200.19 LBS | SYSTOLIC BLOOD PRESSURE: 150 MMHG | OXYGEN SATURATION: 97 % | HEIGHT: 68 IN

## 2024-11-12 DIAGNOSIS — E87.5 HYPERKALEMIA: ICD-10-CM

## 2024-11-12 DIAGNOSIS — E55.9 VITAMIN D DEFICIENCY: ICD-10-CM

## 2024-11-12 DIAGNOSIS — R73.03 PREDIABETES: ICD-10-CM

## 2024-11-12 DIAGNOSIS — I10 PRIMARY HYPERTENSION: Primary | ICD-10-CM

## 2024-11-12 DIAGNOSIS — C61 PROSTATE CANCER: ICD-10-CM

## 2024-11-12 PROCEDURE — 99214 OFFICE O/P EST MOD 30 MIN: CPT | Mod: ,,,

## 2024-11-12 PROCEDURE — 1125F AMNT PAIN NOTED PAIN PRSNT: CPT | Mod: CPTII,,,

## 2024-11-12 PROCEDURE — 1159F MED LIST DOCD IN RCRD: CPT | Mod: CPTII,,,

## 2024-11-12 PROCEDURE — 1160F RVW MEDS BY RX/DR IN RCRD: CPT | Mod: CPTII,,,

## 2024-11-12 PROCEDURE — 3078F DIAST BP <80 MM HG: CPT | Mod: CPTII,,,

## 2024-11-12 PROCEDURE — 3077F SYST BP >= 140 MM HG: CPT | Mod: CPTII,,,

## 2024-11-12 PROCEDURE — 1101F PT FALLS ASSESS-DOCD LE1/YR: CPT | Mod: CPTII,,,

## 2024-11-12 PROCEDURE — 3288F FALL RISK ASSESSMENT DOCD: CPT | Mod: CPTII,,,

## 2024-11-12 RX ORDER — TERBINAFINE HYDROCHLORIDE 250 MG/1
250 TABLET ORAL DAILY
COMMUNITY
Start: 2024-10-24

## 2024-11-12 NOTE — ASSESSMENT & PLAN NOTE
Blood pressure was elevated today in clinic.   Advised the patient to monitor at home twice daily.     RTC in two weeks for nurse visit to reassess BP.

## 2024-11-12 NOTE — ASSESSMENT & PLAN NOTE
Lab Results   Component Value Date    HGBA1C 6.1 (H) 10/15/2024    HGBA1C 6.1 (H) 07/01/2024    HGBA1C 6.0 01/04/2023    CREATININE 1.20 10/15/2024        A1C 6.1.   Continue diet modifications.

## 2024-11-12 NOTE — ASSESSMENT & PLAN NOTE
Lab Results   Component Value Date    GIAWGAHT40EX 45.1 10/15/2024    [id number] 20.6 (L) 03/26/2024      Vitamin D has improved.   Continue current supplementation.     Avoid taking high-dose calcium supplements.   Avoid taking multiple supplements containing vitamin D as this can lead to toxicity.   Foods that contain Vitamin D include milk, orange juice, salmon, canned tuna.

## 2024-11-12 NOTE — PROGRESS NOTES
Patient ID: [id number]     Chief Complaint: Follow-up (Lab results )    HPI:     Sundar Montanez is a 74 y.o. male here today for a follow up to discuss lab results. No other complaints today.     Past Medical History:   Diagnosis Date    Albuminuria     Anxiety 2023    BPH (benign prostatic hyperplasia) 2023    CAD S/P percutaneous coronary angioplasty 2023    Cancer     Chronic low back pain     GERD (gastroesophageal reflux disease)     HTN (hypertension) 2006    Hyperlipidemia 2006    Hypertension     Hypogonadism in male 2023    Hypothyroid 2023    Insomnia     Lung nodule 2023    Prediabetes 2023    Prostate cancer 2024    Radiculopathy         Past Surgical History:   Procedure Laterality Date    ANGIOGRAM  EXTREMITY BILATERAL      back stimulator      CHOLECYSTECTOMY N/A     COLONOSCOPY  2020    Dr Camara  Repeat 4 years    CORONARY ANGIOPLASTY WITH STENT PLACEMENT      INGUINAL HERNIA REPAIR      Reverse Vasectomy      SPINAL FUSION      TRANSURETHRAL RESECTION OF PROSTATE N/A     VASECTOMY          Social History     Socioeconomic History    Marital status:     Number of children: 1   Tobacco Use    Smoking status: Former     Current packs/day: 0.00     Types: Cigarettes     Quit date: 1972     Years since quittin.5    Smokeless tobacco: Never   Substance and Sexual Activity    Alcohol use: No    Drug use: Yes     Types: Oxycodone    Sexual activity: Not Currently     Partners: Female     Social Drivers of Health     Financial Resource Strain: Low Risk  (2024)    Overall Financial Resource Strain (CARDIA)     Difficulty of Paying Living Expenses: Not hard at all   Food Insecurity: No Food Insecurity (2024)    Hunger Vital Sign     Worried About Running Out of Food in the Last Year: Never true     Ran Out of Food in the Last Year: Never true   Transportation Needs: No Transportation Needs (2024)    TRANSPORTATION  NEEDS     Transportation : No   Physical Activity: Inactive (8/27/2024)    Exercise Vital Sign     Days of Exercise per Week: 0 days     Minutes of Exercise per Session: 0 min   Stress: No Stress Concern Present (8/27/2024)    [employer] [place] [employer] - Occupational Stress Questionnaire     Feeling of Stress : Not at all   Housing Stability: Low Risk  (8/27/2024)    Housing Stability Vital Sign     Unable to Pay for Housing in the Last Year: No     Homeless in the Last Year: No        Current Outpatient Medications   Medication Instructions    amitriptyline (ELAVIL) 50 mg, Nightly    aspirin (ECOTRIN) 81 mg, Once    atorvastatin (LIPITOR) 40 mg, Daily    carboxymethylcellulose-glycern (REFRESH OPTIVE) 0.5-0.9 % Drop Instill 1-2 drops in affected eye(s) as needed.    celecoxib (CELEBREX) 100 mg, Oral, Every 3 days, celecoxib 100 mg capsule 1 CAPSULE BY MOUTH ONCE DAILY AFTER MEALS    cholecalciferol (vitamin D3) (VITAMIN D3) 5,000 Units, Oral, Daily    diltiaZEM (TIAZAC) 360 mg, Daily    eszopiclone (LUNESTA) 3 mg, Oral, Nightly    hydrOXYzine HCL (ATARAX) 25 mg, Oral, Nightly PRN    lisinopriL 10 MG tablet 1 tablet, Daily    multivitamin with folic acid 400 mcg Tab 1 tablet, Every morning    naloxone (NARCAN) 4 mg/actuation Spry As needed (PRN)    oxyCODONE (ROXICODONE) 15 mg, Every 6 hours PRN    terbinafine HCL (LAMISIL) 250 mg, Daily       Review of patient's allergies indicates:   Allergen Reactions    Valium [diazepam]         Patient Care Team:  Kenny Leavitt Sr., MD as PCP - General (Family Medicine)  Gamaliel Champion MD (Anesthesiology)  Thong Jay MD as Consulting Physician (Urology)  Lj Rader MD as Consulting Physician (Cardiology)     Subjective:     Review of Systems    12 point review of systems conducted, negative except as stated in the history of present illness. See HPI for details.    Objective:     Visit Vitals  BP (!) 150/70 (BP Location: Left arm,  "Patient Position: Sitting)   Pulse 67   Temp 98.5 °F (36.9 °C)   Resp 18   Ht 5' 8" (1.727 m)   Wt 90.8 kg (200 lb 3.2 oz)   SpO2 97%   BMI 30.44 kg/m²       Physical Exam  Vitals and nursing note reviewed.   Constitutional:       General: He is not in acute distress.     Appearance: He is not ill-appearing.   HENT:      Head: Normocephalic and atraumatic.      Mouth/Throat:      Mouth: Mucous membranes are moist.      Pharynx: Oropharynx is clear.   Eyes:      General: No scleral icterus.     Extraocular Movements: Extraocular movements intact.      Conjunctiva/sclera: Conjunctivae normal.      Pupils: Pupils are equal, round, and reactive to light.   Neck:      Vascular: No carotid bruit.   Cardiovascular:      Rate and Rhythm: Normal rate and regular rhythm.      Heart sounds: No murmur heard.     No friction rub. No gallop.   Pulmonary:      Effort: Pulmonary effort is normal. No respiratory distress.      Breath sounds: Normal breath sounds. No wheezing, rhonchi or rales.   Abdominal:      General: Abdomen is flat. Bowel sounds are normal. There is no distension.      Palpations: Abdomen is soft. There is no mass.      Tenderness: There is no abdominal tenderness.   Musculoskeletal:         General: Normal range of motion.      Cervical back: Normal range of motion and neck supple.   Skin:     General: Skin is warm and dry.   Neurological:      General: No focal deficit present.      Mental Status: He is alert.   Psychiatric:         Mood and Affect: Mood normal.       Labs Reviewed:     Chemistry:  Lab Results   Component Value Date     (H) 10/15/2024    K 5.7 (H) 10/15/2024    BUN 16 10/15/2024    CREATININE 1.20 10/15/2024    EGFRNORACEVR 63 10/15/2024    CALCIUM 10.0 10/15/2024    ALBUMIN 4.5 10/15/2024    BILIDIR <0.20 07/01/2024    AST 27 10/15/2024    ALT 23 10/15/2024    EHIBFWER23DF 45.1 10/15/2024    TSH 2.900 10/15/2024        Lab Results   Component Value Date    HGBA1C 6.1 (H) 10/15/2024    "     Hematology:  Lab Results   Component Value Date    WBC 6.4 10/15/2024    HGB 14.6 10/15/2024    HCT 45.7 10/15/2024     10/15/2024       Lipid Panel:  Lab Results   Component Value Date    CHOL 175 10/15/2024    HDL 50 10/15/2024    TRIG 135 10/15/2024      Assessment:       ICD-10-CM ICD-9-CM   1. Primary hypertension  I10 401.9   2. Hyperkalemia  E87.5 276.7   3. Prostate cancer  C61 185   4. Vitamin D deficiency  E55.9 268.9   5. Prediabetes  R73.03 790.29     Plan:     1. Primary hypertension  Overview:  Current Medication: Diltiazem 360 mg one po daily + Lisinopril 10 mg daily.   Low Sodium Diet (DASH Diet - Less than 2 grams of sodium per day).  Monitor blood pressure daily and log. Report consistent numbers greater than 140/90.  Maintain healthy weight with goal BMI <30. Exercise 30 minutes per day, 5 days per week.     Assessment & Plan:  Blood pressure was elevated today in clinic.   Advised the patient to monitor at home twice daily.     RTC in two weeks for nurse visit to reassess BP.       2. Hyperkalemia  Assessment & Plan:  Potassium last month was 5.7. Recheck potassium today.       Orders:  -     Comprehensive Metabolic Panel; Future; Expected date: 11/12/2024    3. Prostate cancer  Assessment & Plan:  Managed by Dr. Thong Jay.     Patient is scheduled to start treatment in December 2024.       4. Vitamin D deficiency  Overview:  Currently taking Vitamin D3 5000 units per day.     Assessment & Plan:  Lab Results   Component Value Date    [id number] 45.1 10/15/2024    WHVPDJIU46NU 20.6 (L) 03/26/2024      Vitamin D has improved.   Continue current supplementation.     Avoid taking high-dose calcium supplements.   Avoid taking multiple supplements containing vitamin D as this can lead to toxicity.   Foods that contain Vitamin D include milk, orange juice, salmon, canned tuna.         5. Prediabetes  Overview:  Patient is diet controlled .  Follow ADA Diet. Avoid soda, simple sweets,  and limit rice/pasta/breads/starches (no more than 45-50 grams per meal).  Maintain healthy weight with goal BMI <30.  Exercise 5 times per week for 30 minutes per day.  Stressed importance of daily foot exams especially if neuropathy is present.  Patient was instructed to notify physician if they notice any sores or skin lesions on the feet.  Stressed the importance of wearing proper fitting comfortable shoes i.e. diabetic footwear.  They were instructed to always wear shoes and never go barefooted.  Stressed importance of annual dilated eye exam.    Assessment & Plan:  Lab Results   Component Value Date    HGBA1C 6.1 (H) 10/15/2024    HGBA1C 6.1 (H) 07/01/2024    HGBA1C 6.0 01/04/2023    CREATININE 1.20 10/15/2024        A1C 6.1.   Continue diet modifications.         Follow up in about 2 weeks (around 11/26/2024) for Nurse Visit for blood pressure check . In addition to their scheduled follow up, the patient has also been instructed to follow up on as needed basis.     Future Appointments   Date Time Provider Department Center   11/26/2024  8:30 AM NURSE, [facility] PIEDAD Navarrete NP

## 2024-11-13 ENCOUNTER — TELEPHONE (OUTPATIENT)
Dept: FAMILY MEDICINE | Facility: CLINIC | Age: 74
End: 2024-11-13
Payer: MEDICARE

## 2024-11-13 LAB
ALBUMIN SERPL-MCNC: 4.6 G/DL (ref 3.8–4.8)
ALP SERPL-CCNC: 154 IU/L (ref 44–121)
ALT SERPL-CCNC: 37 IU/L (ref 0–44)
AST SERPL-CCNC: 27 IU/L (ref 0–40)
BILIRUB SERPL-MCNC: 0.4 MG/DL (ref 0–1.2)
BUN SERPL-MCNC: 14 MG/DL (ref 8–27)
BUN/CREAT SERPL: 13 (ref 10–24)
CALCIUM SERPL-MCNC: 9.9 MG/DL (ref 8.6–10.2)
CHLORIDE SERPL-SCNC: 103 MMOL/L (ref 96–106)
CO2 SERPL-SCNC: 27 MMOL/L (ref 20–29)
CREAT SERPL-MCNC: 1.1 MG/DL (ref 0.76–1.27)
EST. GFR  (NO RACE VARIABLE): 70 ML/MIN/1.73
GLOBULIN SER CALC-MCNC: 2.8 G/DL (ref 1.5–4.5)
GLUCOSE SERPL-MCNC: 104 MG/DL (ref 70–99)
POTASSIUM SERPL-SCNC: 4.8 MMOL/L (ref 3.5–5.2)
PROT SERPL-MCNC: 7.4 G/DL (ref 6–8.5)
SODIUM SERPL-SCNC: 143 MMOL/L (ref 134–144)

## 2024-11-13 NOTE — TELEPHONE ENCOUNTER
----- Message from Ariella Navarrete NP sent at 11/13/2024  9:02 AM CST -----  Potassium is within normal ranges.

## 2024-11-26 ENCOUNTER — CLINICAL SUPPORT (OUTPATIENT)
Dept: FAMILY MEDICINE | Facility: CLINIC | Age: 74
End: 2024-11-26
Payer: MEDICARE

## 2024-11-26 VITALS — HEART RATE: 65 BPM | SYSTOLIC BLOOD PRESSURE: 110 MMHG | DIASTOLIC BLOOD PRESSURE: 68 MMHG

## 2024-11-26 DIAGNOSIS — I10 PRIMARY HYPERTENSION: Primary | ICD-10-CM

## 2024-11-26 RX ORDER — OMEPRAZOLE 40 MG/1
40 CAPSULE, DELAYED RELEASE ORAL EVERY MORNING
COMMUNITY
Start: 2024-11-16

## 2024-11-26 NOTE — PROGRESS NOTES
Confirmed pt's name and . Patient tolerated BP well. Reading was 110/68. Pt stated big improvement for him and that he has been taking his medications as prescribed.

## 2025-01-17 DIAGNOSIS — G47.00 INSOMNIA, UNSPECIFIED TYPE: ICD-10-CM

## 2025-01-24 RX ORDER — ESZOPICLONE 3 MG/1
3 TABLET, FILM COATED ORAL NIGHTLY
Qty: 30 TABLET | Refills: 2 | Status: SHIPPED | OUTPATIENT
Start: 2025-01-24

## 2025-02-17 DIAGNOSIS — M51.369 DDD (DEGENERATIVE DISC DISEASE), LUMBAR: ICD-10-CM

## 2025-02-17 RX ORDER — CELECOXIB 100 MG/1
100 CAPSULE ORAL
Qty: 30 CAPSULE | Refills: 2 | Status: SHIPPED | OUTPATIENT
Start: 2025-02-17

## 2025-04-28 DIAGNOSIS — G47.00 INSOMNIA, UNSPECIFIED TYPE: ICD-10-CM

## 2025-04-29 RX ORDER — ESZOPICLONE 3 MG/1
3 TABLET, FILM COATED ORAL NIGHTLY
Qty: 30 TABLET | Refills: 2 | Status: SHIPPED | OUTPATIENT
Start: 2025-04-29

## 2025-04-29 NOTE — TELEPHONE ENCOUNTER
Patient requesting refill on Lunesta 3 mg.      Last Rx 1/17/25   # 30  X 2       Last office visit 11/12/2024      Next appt   5/5/25   Lab    Next appt   5/22/25  Lab results

## 2025-05-06 ENCOUNTER — RESULTS FOLLOW-UP (OUTPATIENT)
Dept: FAMILY MEDICINE | Facility: CLINIC | Age: 75
End: 2025-05-06

## 2025-05-08 ENCOUNTER — OFFICE VISIT (OUTPATIENT)
Dept: FAMILY MEDICINE | Facility: CLINIC | Age: 75
End: 2025-05-08
Payer: MEDICARE

## 2025-05-08 VITALS
OXYGEN SATURATION: 98 % | HEIGHT: 68 IN | DIASTOLIC BLOOD PRESSURE: 76 MMHG | BODY MASS INDEX: 30.01 KG/M2 | WEIGHT: 198 LBS | SYSTOLIC BLOOD PRESSURE: 138 MMHG | RESPIRATION RATE: 18 BRPM | TEMPERATURE: 98 F | HEART RATE: 68 BPM

## 2025-05-08 DIAGNOSIS — I10 PRIMARY HYPERTENSION: Primary | ICD-10-CM

## 2025-05-08 DIAGNOSIS — R73.03 PREDIABETES: ICD-10-CM

## 2025-05-08 DIAGNOSIS — E78.2 MIXED HYPERLIPIDEMIA: ICD-10-CM

## 2025-05-08 PROCEDURE — 3078F DIAST BP <80 MM HG: CPT | Mod: CPTII,,,

## 2025-05-08 PROCEDURE — 1159F MED LIST DOCD IN RCRD: CPT | Mod: CPTII,,,

## 2025-05-08 PROCEDURE — 3075F SYST BP GE 130 - 139MM HG: CPT | Mod: CPTII,,,

## 2025-05-08 PROCEDURE — 99213 OFFICE O/P EST LOW 20 MIN: CPT | Mod: ,,,

## 2025-05-08 PROCEDURE — 1101F PT FALLS ASSESS-DOCD LE1/YR: CPT | Mod: CPTII,,,

## 2025-05-08 PROCEDURE — 3288F FALL RISK ASSESSMENT DOCD: CPT | Mod: CPTII,,,

## 2025-05-08 PROCEDURE — 1160F RVW MEDS BY RX/DR IN RCRD: CPT | Mod: CPTII,,,

## 2025-05-08 RX ORDER — AMMONIUM LACTATE 12 G/100G
LOTION TOPICAL
COMMUNITY

## 2025-05-08 RX ORDER — LISINOPRIL AND HYDROCHLOROTHIAZIDE 12.5; 2 MG/1; MG/1
1 TABLET ORAL EVERY MORNING
COMMUNITY

## 2025-05-08 RX ORDER — MUPIROCIN 20 MG/G
OINTMENT TOPICAL 2 TIMES DAILY
COMMUNITY
Start: 2025-02-17

## 2025-05-08 RX ORDER — CLOBETASOL PROPIONATE 0.5 MG/G
OINTMENT TOPICAL
COMMUNITY
Start: 2025-04-14

## 2025-05-08 RX ORDER — HYDROCORTISONE 25 MG/G
CREAM TOPICAL DAILY PRN
COMMUNITY
Start: 2025-01-17

## 2025-05-08 NOTE — ASSESSMENT & PLAN NOTE
Lab Results   Component Value Date    HGBA1C 5.7 (H) 05/05/2025    HGBA1C 6.1 (H) 10/15/2024    HGBA1C 6.0 01/04/2023    CREATININE 1.17 05/05/2025        A1c 5.7.  Continue diet modifications.

## 2025-05-08 NOTE — PROGRESS NOTES
[facility]  Lore Nunes MD    Patient ID: [id number]     Chief Complaint: Results      HPI:     Sundar Montanez is a 75 y.o. male with prostate cancer, CAD, hypertension, hyperlipidemia, prediabetes, vitamin-D deficiency degenerative disc disease, anxiety here today for follow up of chronic conditions.      A1c 5.7    Patient has prostate cancer s/p radiation.  He is currently on Eligard.  He reports mood swings and hot flashes.      Past Medical History:   Diagnosis Date    Albuminuria     Anxiety 2023    BPH (benign prostatic hyperplasia) 2023    CAD S/P percutaneous coronary angioplasty 2023    Cancer     Chronic low back pain     GERD (gastroesophageal reflux disease)     HTN (hypertension) 2006    Hyperlipidemia 2006    Hypertension     Hypogonadism in male 2023    Hypothyroid 2023    Insomnia     Lung nodule 2023    Prediabetes 2023    Prostate cancer 2024    Radiculopathy         Past Surgical History:   Procedure Laterality Date    ANGIOGRAM  EXTREMITY BILATERAL      back stimulator      CHOLECYSTECTOMY N/A     COLONOSCOPY  2020    Dr Camara  Repeat 4 years    CORONARY ANGIOPLASTY WITH STENT PLACEMENT      INGUINAL HERNIA REPAIR      Reverse Vasectomy      SPINAL FUSION      TRANSURETHRAL RESECTION OF PROSTATE N/A     VASECTOMY          Social History     Tobacco Use    Smoking status: Former     Current packs/day: 0.00     Types: Cigarettes     Quit date: 1972     Years since quittin.0    Smokeless tobacco: Never   Substance and Sexual Activity    Alcohol use: No    Drug use: Yes     Types: Oxycodone    Sexual activity: Not Currently     Partners: Female        Current Outpatient Medications   Medication Instructions    amitriptyline (ELAVIL) 50 mg, Nightly    ammonium lactate (LAC-HYDRIN) 12 % lotion APPLY TO AFFECTED AREA ON FEET TWICE A DAY    aspirin (ECOTRIN) 81 mg, Once    atorvastatin (LIPITOR) 40 mg,  "Daily    carboxymethylcellulose-glycern (REFRESH OPTIVE) 0.5-0.9 % Drop Instill 1-2 drops in affected eye(s) as needed.    celecoxib (CELEBREX) 100 mg, Oral, Every 3 days, celecoxib 100 mg capsule 1 CAPSULE BY MOUTH ONCE DAILY AFTER MEALS    cholecalciferol (vitamin D3) (VITAMIN D3) 5,000 Units, Oral, Daily    clobetasol 0.05% (TEMOVATE) 0.05 % Oint SMARTSI.5 Gram(s) Topical Twice Daily PRN    diltiaZEM (TIAZAC) 360 mg, Daily    eszopiclone (LUNESTA) 3 mg, Oral, Nightly    hydrocortisone 2.5 % cream Daily PRN    hydrOXYzine HCL (ATARAX) 25 mg, Oral, Nightly PRN    lisinopriL 10 MG tablet 1 tablet, Daily    lisinopriL-hydrochlorothiazide (PRINZIDE,ZESTORETIC) 20-12.5 mg per tablet 1 tablet, Every morning    multivitamin with folic acid 400 mcg Tab 1 tablet, Every morning    mupirocin (BACTROBAN) 2 % ointment 2 times daily    naloxone (NARCAN) 4 mg/actuation Spry As needed (PRN)    omeprazole (PRILOSEC) 40 mg, Every morning    oxyCODONE (ROXICODONE) 15 mg, Every 6 hours PRN    terbinafine HCL (LAMISIL) 250 mg, Daily       Review of patient's allergies indicates:   Allergen Reactions    Valium [diazepam]         Patient Care Team:  Kenny Leavitt Sr., MD as PCP - General (Family Medicine)  Gamaliel Champion MD (Anesthesiology)  Thong Jay MD as Consulting Physician (Urology)  Lj Rader MD as Consulting Physician (Cardiology)     Subjective:     Review of Systems    12 point review of systems conducted, negative except as stated in the history of present illness. See HPI for details.    Objective:     Visit Vitals  /76   Pulse 68   Temp 97.9 °F (36.6 °C) (Skin)   Resp 18   Ht 5' 8" (1.727 m)   Wt 89.8 kg (198 lb)   SpO2 98%   BMI 30.11 kg/m²       Physical Exam  Vitals and nursing note reviewed.   Constitutional:       General: He is not in acute distress.     Appearance: Normal appearance. He is not ill-appearing or diaphoretic.   HENT:      Head: Normocephalic and atraumatic.     " " Mouth/Throat:      Mouth: Mucous membranes are moist.      Pharynx: Oropharynx is clear.   Eyes:      Extraocular Movements: Extraocular movements intact.      Conjunctiva/sclera: Conjunctivae normal.   Cardiovascular:      Rate and Rhythm: Normal rate and regular rhythm.      Pulses: Normal pulses.      Heart sounds: Murmur heard.   Pulmonary:      Effort: Pulmonary effort is normal. No respiratory distress.      Breath sounds: Normal breath sounds. No wheezing, rhonchi or rales.   Musculoskeletal:      Right lower leg: No edema.      Left lower leg: No edema.   Skin:     General: Skin is warm and dry.   Neurological:      General: No focal deficit present.      Mental Status: He is alert and oriented to person, place, and time. Mental status is at baseline.   Psychiatric:         Mood and Affect: Mood normal.         Labs Reviewed:     Chemistry:  Lab Results   Component Value Date     05/05/2025    K 5.1 05/05/2025    BUN 17 05/05/2025    CREATININE 1.17 05/05/2025    EGFRNORACEVR 64 05/05/2025    CALCIUM 9.8 05/05/2025    ALBUMIN 4.4 05/05/2025    BILIDIR <0.20 07/01/2024    AST 20 05/05/2025    ALT 21 05/05/2025    CNJOGGOO31ZK 45.1 10/15/2024    TSH 2.900 10/15/2024        Lab Results   Component Value Date    HGBA1C 5.7 (H) 05/05/2025        Hematology:  Lab Results   Component Value Date    WBC 5.6 05/05/2025    HGB 13.4 05/05/2025    HCT 41.4 05/05/2025     05/05/2025       Lipid Panel:  Lab Results   Component Value Date    CHOL 178 05/05/2025    HDL 41 05/05/2025    TRIG 206 (H) 05/05/2025        Urine:  No results found for: "COLORUA", "APPEARANCEUA", "SGUA", "PHUA", "PROTEINUA", "GLUCOSEUA", "KETONESUA", "BLOODUA", "NITRITESUA", "LEUKOCYTESUR", "RBCUA", "WBCUA", "BACTERIA", "SQEPUA", "HYALINECASTS", "CREATRANDUR", "PROTEINURINE", "UPROTCREA"     Assessment:       ICD-10-CM ICD-9-CM   1. Primary hypertension  I10 401.9   2. Mixed hyperlipidemia  E78.2 272.2   3. Prediabetes  R73.03 790.29 "        Plan:     1. Primary hypertension  Overview:  Current Medication: Diltiazem 360 mg one po daily + Lisinopril 10 mg daily.   Low Sodium Diet (DASH Diet - Less than 2 grams of sodium per day).  Monitor blood pressure daily and log. Report consistent numbers greater than 140/90.  Maintain healthy weight with goal BMI <30. Exercise 30 minutes per day, 5 days per week.     Assessment & Plan:  Well-controlled.  Continue above regimen      2. Mixed hyperlipidemia  Overview:  Continue with Atorvastatin nightly as prescribed by Cardiology  Stressed importance of dietary modifications. Follow a low cholesterol, low saturated fat diet with less that 200mg of cholesterol a day.  Avoid fried foods and high saturated fats (high saturated fats less than 7% of calories).  Add Flax Seed/Fish Oil supplements to diet. Increase dietary fiber.  Regular exercise can reduce LDL and raise HDL. Stressed importance of physical activity 5 times per week for 30 minutes per day.     Assessment & Plan:  .  Managed by Cardiology.  Continue atorvastatin 40 mg daily.  We will send labs to cardiologist      3. Prediabetes  Overview:  Patient is diet controlled .  Follow ADA Diet. Avoid soda, simple sweets, and limit rice/pasta/breads/starches (no more than 45-50 grams per meal).  Maintain healthy weight with goal BMI <30.  Exercise 5 times per week for 30 minutes per day.  Stressed importance of daily foot exams especially if neuropathy is present.  Patient was instructed to notify physician if they notice any sores or skin lesions on the feet.  Stressed the importance of wearing proper fitting comfortable shoes i.e. diabetic footwear.  They were instructed to always wear shoes and never go barefooted.  Stressed importance of annual dilated eye exam.    Assessment & Plan:  Lab Results   Component Value Date    HGBA1C 5.7 (H) 05/05/2025    HGBA1C 6.1 (H) 10/15/2024    HGBA1C 6.0 01/04/2023    CREATININE 1.17 05/05/2025        A1c  5.7.  Continue diet modifications.            No follow-ups on file. In addition to their scheduled follow up, the patient has also been instructed to follow up on as needed basis.     No future appointments.       Lore Nunes MD

## 2025-05-29 DIAGNOSIS — M51.369 DDD (DEGENERATIVE DISC DISEASE), LUMBAR: ICD-10-CM

## 2025-05-29 RX ORDER — CELECOXIB 100 MG/1
100 CAPSULE ORAL
Qty: 30 CAPSULE | Refills: 2 | Status: SHIPPED | OUTPATIENT
Start: 2025-05-29

## 2025-05-29 NOTE — TELEPHONE ENCOUNTER
Patient called requesting a refill on Celebrex     Last refill 2/17/25   # 30 X 2      Last office visit 5/8/25

## (undated) DEVICE — DRESSING LEUKOPLAST FLEX 1X3IN